# Patient Record
Sex: FEMALE | Race: WHITE | Employment: FULL TIME
[De-identification: names, ages, dates, MRNs, and addresses within clinical notes are randomized per-mention and may not be internally consistent; named-entity substitution may affect disease eponyms.]

---

## 2017-07-15 ENCOUNTER — HEALTH MAINTENANCE LETTER (OUTPATIENT)
Age: 27
End: 2017-07-15

## 2018-01-12 ENCOUNTER — OFFICE VISIT (OUTPATIENT)
Dept: URGENT CARE | Facility: URGENT CARE | Age: 28
End: 2018-01-12
Payer: COMMERCIAL

## 2018-01-12 VITALS
TEMPERATURE: 96.9 F | HEART RATE: 58 BPM | DIASTOLIC BLOOD PRESSURE: 75 MMHG | SYSTOLIC BLOOD PRESSURE: 129 MMHG | OXYGEN SATURATION: 97 %

## 2018-01-12 DIAGNOSIS — F41.8 DEPRESSION WITH ANXIETY: Primary | ICD-10-CM

## 2018-01-12 PROCEDURE — 84443 ASSAY THYROID STIM HORMONE: CPT | Performed by: INTERNAL MEDICINE

## 2018-01-12 PROCEDURE — 36415 COLL VENOUS BLD VENIPUNCTURE: CPT | Performed by: INTERNAL MEDICINE

## 2018-01-12 PROCEDURE — 99203 OFFICE O/P NEW LOW 30 MIN: CPT | Performed by: INTERNAL MEDICINE

## 2018-01-12 ASSESSMENT — ENCOUNTER SYMPTOMS
INSOMNIA: 1
VOMITING: 0
DIZZINESS: 0
HALLUCINATIONS: 0
WEIGHT LOSS: 0
PALPITATIONS: 0
CONSTIPATION: 0
HEADACHES: 0
TREMORS: 0
MYALGIAS: 0
ABDOMINAL PAIN: 0
NERVOUS/ANXIOUS: 1
DIARRHEA: 0
DEPRESSION: 1
NAUSEA: 0

## 2018-01-12 ASSESSMENT — ANXIETY QUESTIONNAIRES
4. TROUBLE RELAXING: NEARLY EVERY DAY
7. FEELING AFRAID AS IF SOMETHING AWFUL MIGHT HAPPEN: SEVERAL DAYS
GAD7 TOTAL SCORE: 16
1. FEELING NERVOUS, ANXIOUS, OR ON EDGE: NEARLY EVERY DAY
2. NOT BEING ABLE TO STOP OR CONTROL WORRYING: NEARLY EVERY DAY
6. BECOMING EASILY ANNOYED OR IRRITABLE: SEVERAL DAYS
3. WORRYING TOO MUCH ABOUT DIFFERENT THINGS: NEARLY EVERY DAY
5. BEING SO RESTLESS THAT IT IS HARD TO SIT STILL: MORE THAN HALF THE DAYS

## 2018-01-12 ASSESSMENT — PATIENT HEALTH QUESTIONNAIRE - PHQ9: SUM OF ALL RESPONSES TO PHQ QUESTIONS 1-9: 19

## 2018-01-12 NOTE — LETTER
[facility]  [address] [facility]  [address]  [place]  [address]  Tel: [phone]    January 16, 2018    Leana Dubois[address]   [facility] [address]        Good day to you Leana.    Your thyroid function test is normal.    Resulted Orders   TSH with free T4 reflex   Result Value Ref Range    TSH 3.10 0.40 - 4.00 mU/L         Dr. Monie Joe  bertrand

## 2018-01-12 NOTE — MR AVS SNAPSHOT
After Visit Summary   1/12/2018    Leana Ivey    MRN: [id number]           Patient Information     Date Of Birth          1990        Visit Information        Provider Department      1/12/2018 5:45 PM Ricky Lomax MD [facility]        Today's Diagnoses     Depression with anxiety    -  1      Care Instructions    Start Zoloft.    Continue Wellbutrin and Trazodone.    Follow up with your regular doctor and discuss about further management for your depression/anxiety.          Follow-ups after your visit        Your next 10 appointments already scheduled     Jan 22, 2018  9:00 AM CST   [facility]    [address] [facility] [address] [phone]           Bring a current list of meds and any records pertaining to this visit. For Physicals, please bring immunization records and any forms needing to be filled out. Please arrive 10 minutes early to complete paperwork.              Who to contact     If you have questions or need follow up information about today's clinic visit or your schedule please contact [facility] directly at [phone].  Normal or non-critical lab and imaging results will be communicated to you by MyChart, letter or phone within 4 business days after the clinic has received the results. If you do not hear from us within 7 days, please contact the clinic through [facility] or phone. If you have a critical or abnormal lab result, we will notify you by phone as soon as possible.  Submit refill requests through [employer] or call your pharmacy and they will forward the refill request to us. Please allow 3 business days for your refill to be completed.          Additional Information About Your Visit        [facility] Information     [employer] lets you send messages to your doctor, view your test results, renew your  "prescriptions, schedule appointments and more. To sign up, go to [email or web address] . Click on \"Log in\" on the left side of the screen, which will take you to the Welcome page. Then click on \"Sign up Now\" on the right side of the page.     You will be asked to enter the access code listed below, as well as some personal information. Please follow the directions to create your username and password.     Your access code is: [email or web address]  Expires: 2018  6:05 PM     Your access code will  in 90 days. If you need help or a new code, please call your [facility] clinic or [phone].        Care EveryWhere ID     This is your Care EveryWhere ID. This could be used by other organizations to access your [facility] medical records  [id number]        Your Vitals Were     Pulse Temperature Pulse Oximetry             58 96.9  F (36.1  C) (Oral) 97%          Blood Pressure from Last 3 Encounters:   18 129/75    Weight from Last 3 Encounters:   No data found for Wt              We Performed the Following     TSH with free T4 reflex          Today's Medication Changes          These changes are accurate as of: 18  6:05 PM.  If you have any questions, ask your nurse or doctor.               Start taking these medicines.        Dose/Directions    sertraline 50 MG tablet   Commonly known as:  ZOLOFT   Used for:  Depression with anxiety   Started by:  Ricky Lomax MD        Dose:  50 mg   Take 1 tablet (50 mg) by mouth daily   Quantity:  30 tablet   Refills:  0            Where to get your medicines      These medications were sent to [facility] [address] [place] [address] & [address]  [address] [place] [address]     Phone:  [phone]     sertraline 50 MG tablet                Primary Care Provider Fax #    Provider Not In System [phone]                Equal Access to Services     KIARA PORTILLO AH: Masha Mancera, " washayyuniel mccormackcarlos, micheleybta kaitzel nielsen, guerrero camposaatonja ah. So [facility] [place] [phone].    ATENCIÓN: Si kelbyla babatunde, tiene a abreu disposición servicios gratuitos de asistencia lingüística. Jennifer al [phone].    We comply with applicable federal civil rights laws and [place] laws. We do not discriminate on the basis of race, color, national origin, age, disability, sex, sexual orientation, or gender identity.            Thank you!     Thank you for choosing [facility]  for your care. Our goal is always to provide you with excellent care. Hearing back from our patients is one way we can continue to improve our services. Please take a few minutes to complete the written survey that you may receive in the mail after your visit with us. Thank you!             Your Updated Medication List - Protect others around you: Learn how to safely use, store and throw away your medicines at www.disposemymeds.org.          This list is accurate as of: 1/12/18  6:05 PM.  Always use your most recent med list.                   Brand Name Dispense Instructions for use Diagnosis    sertraline 50 MG tablet    ZOLOFT    30 tablet    Take 1 tablet (50 mg) by mouth daily    Depression with anxiety       TRAZODONE HCL PO           WELLBUTRIN PO

## 2018-01-12 NOTE — PROGRESS NOTES
"HPI Comments:   Patient comes in with complaint of increasing depression and anxiety. She states that she just got out of a 10 year relationship and this has been very stressful. She is currently on Wellbutrin. Also takes trazodone for insomnia. Denies thoughts of hurting herself or others. She was requesting for something \"fast acting\".  States that she tried her mom's Ativan which worked well for her.      Past Medical History:   Diagnosis Date     Depression with anxiety 1/12/2018       Review of Systems   Constitutional: Negative for malaise/fatigue and weight loss.   Cardiovascular: Negative for chest pain and palpitations.   Gastrointestinal: Negative for abdominal pain, constipation, diarrhea, nausea and vomiting.   Musculoskeletal: Negative for myalgias.   Neurological: Negative for dizziness, tremors and headaches.   Psychiatric/Behavioral: Positive for depression. Negative for hallucinations and suicidal ideas. The patient is nervous/anxious and has insomnia.        /75  Pulse 58  Temp 96.9  F (36.1  C) (Oral)  SpO2 97%      Physical Exam   Constitutional: She is oriented to person, place, and time. No distress.   Neck: No thyromegaly present.   Cardiovascular: Normal rate, regular rhythm and normal heart sounds.    Neurological: She is alert and oriented to person, place, and time. Coordination normal. GCS score is 15.   Psychiatric: Affect normal.   Anxious-appearing   Vitals reviewed.        ICD-10-CM    1. Depression with anxiety F41.8 sertraline (ZOLOFT) 50 MG tablet     TSH with free T4 reflex     PHQ-9 score:    PHQ-9 SCORE 1/12/2018   Total Score 19     RODOLFO-7 SCORE 1/12/2018   Total Score 16       **please refer to HPI for status of conditions    **I informed the patient that I will not prescribe a benzodiazepine for this visit      Patient Instructions   Start Zoloft.    Continue Wellbutrin and Trazodone.    Follow up with your regular doctor and discuss about further management for your " depression/anxiety.

## 2018-01-12 NOTE — NURSING NOTE
Chief Complaint   Patient presents with     Urgent Care     Anxiety     high anxiety       Initial /75  Pulse 58  Temp 96.9  F (36.1  C) (Oral)  SpO2 97% There is no height or weight on file to calculate BMI.  Medication Reconciliation: complete   Michelle HATFIELD MA

## 2018-01-13 LAB — TSH SERPL DL<=0.005 MIU/L-ACNC: 3.1 MU/L (ref 0.4–4)

## 2018-01-13 ASSESSMENT — ANXIETY QUESTIONNAIRES: GAD7 TOTAL SCORE: 16

## 2018-01-13 NOTE — PATIENT INSTRUCTIONS
Start Zoloft.    Continue Wellbutrin and Trazodone.    Follow up with your regular doctor and discuss about further management for your depression/anxiety.

## 2018-01-19 NOTE — PROGRESS NOTES
SUBJECTIVE:   Leana Ivey is a 27 year old female who presents to clinic today for the following health issues:    Patient presents to the clinic to follow up on her medications. She was recently seen in the urgent care for depression and she was put on Sertraline 50 MG one tablet daily. She states that she likes the medication, and it has helped her with her habit of biting her fingernails.     Patient reports that she is also trying to lose weight. She states that she has been managing her diet better and also exercising but she states she is looking for more help with weight management. She usually eats two meals a day, while snacking at work. 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 She is working on eating lighter meals at night before bed. She is eating plenty of liquids during the day.      Problem list and histories reviewed & adjusted, as indicated.  Additional history: as documented    Current Outpatient Prescriptions   Medication Sig Dispense Refill     BuPROPion HCl (WELLBUTRIN PO) Take 150 mg by mouth 2 times daily        TRAZODONE HCL PO        sertraline (ZOLOFT) 50 MG tablet Take 1 tablet (50 mg) by mouth daily 30 tablet 0     No Known Allergies    Reviewed and updated as needed this visit by clinical staff  Tobacco  Meds  Med Hx  Surg Hx  Fam Hx  Soc Hx      Reviewed and updated as needed this visit by Provider         ROS:  Constitutional, HEENT, cardiovascular, pulmonary, gi and gu systems are negative, except as otherwise noted.    This document serves as a record of the services and decisions personally performed and made by Roman Fregoso MD. It was created on his/her behalf by Nu Brown, a trained medical scribe. The creation of this document is based the provider's statements to the medical scribe.  Marcela Brown 9:06 AM, January 22, 2018    OBJECTIVE:   /86  Pulse 82  Temp 98.3  F (36.8  C) (Oral)  Ht 1.651 m (5'  "5\")  Wt 88 kg (194 lb)  SpO2 98%  Breastfeeding? No  BMI 32.28 kg/m2  Body mass index is 32.28 kg/(m^2).    Neck was supple without adenopathy or thyromegaly her carotids were normal without bruits  Chest clear to auscultation and percussion  Cardiovascular S1 and S2 are physiologic without murmurs or gallops  Abdomen bowel sounds were normal.  There is no palpable mass or organomegaly  Extremities nontender without any edema  Pulses pedal pulses are as described otherwise his pulses are bilaterally symmetrical throughout without bruits  Skin without significant abnormality    Diagnostic Test Results:  none     ASSESSMENT/PLAN:     1. Depression with anxiety  -Increased her Wellbutrin to 300 MG 1 tablet daily.  - sertraline (ZOLOFT) 50 MG tablet; Take 1 tablet (50 mg) by mouth daily  Dispense: 90 tablet; Refill: 3  - buPROPion (WELLBUTRIN XL) 300 MG 24 hr tablet; Take 1 tablet (300 mg) by mouth every morning  Dispense: 90 tablet; Refill: 3    2.Weight gain  -Discussed better managing her diet and snacking as she watches carbohydrates and sugar. We also discussed ways to improve the results of her exercise regimen. Goal of 1-4 pounds in a month.  3.persistent disorder of maintaining or initiating sleep  -She will return in 3 months to follow up on depression and anxiety, and weight management. She will return sooner with more acute issues.    Roman Fregoso MD  [facility]    The information in this document, created by the medical scribe for me, accurately reflects the services I personally performed and the decisions made by me. I have reviewed and approved this document for accuracy prior to leaving the patient care area.  Roman Fregoso MD  9:32 AM, 01/22/18        "

## 2018-01-22 ENCOUNTER — OFFICE VISIT (OUTPATIENT)
Dept: FAMILY MEDICINE | Facility: CLINIC | Age: 28
End: 2018-01-22
Payer: COMMERCIAL

## 2018-01-22 VITALS
SYSTOLIC BLOOD PRESSURE: 126 MMHG | TEMPERATURE: 98.3 F | BODY MASS INDEX: 32.32 KG/M2 | HEART RATE: 82 BPM | DIASTOLIC BLOOD PRESSURE: 86 MMHG | HEIGHT: 65 IN | WEIGHT: 194 LBS | OXYGEN SATURATION: 98 %

## 2018-01-22 DIAGNOSIS — F41.8 DEPRESSION WITH ANXIETY: Primary | ICD-10-CM

## 2018-01-22 DIAGNOSIS — G47.00 PERSISTENT DISORDER OF INITIATING OR MAINTAINING SLEEP: ICD-10-CM

## 2018-01-22 DIAGNOSIS — R63.5 WEIGHT GAIN: ICD-10-CM

## 2018-01-22 PROCEDURE — 99203 OFFICE O/P NEW LOW 30 MIN: CPT | Performed by: INTERNAL MEDICINE

## 2018-01-22 RX ORDER — BUPROPION HYDROCHLORIDE 300 MG/1
300 TABLET ORAL EVERY MORNING
Qty: 90 TABLET | Refills: 3 | Status: SHIPPED | OUTPATIENT
Start: 2018-01-22 | End: 2018-09-04

## 2018-01-22 NOTE — MR AVS SNAPSHOT
"              After Visit Summary   2018    Leana Ivey    MRN: [id number]           Patient Information     Date Of Birth          1990        Visit Information        Provider Department      2018 9:00 AM Roman Fregoso MD [facility]        Today's Diagnoses     Depression with anxiety    -  1    Persistent disorder of initiating or maintaining sleep        Weight gain           Follow-ups after your visit        Who to contact     If you have questions or need follow up information about today's clinic visit or your schedule please contact [facility] directly at [phone].  Normal or non-critical lab and imaging results will be communicated to you by [employer], letter or phone within 4 business days after the clinic has received the results. If you do not hear from us within 7 days, please contact the clinic through [employer] or phone. If you have a critical or abnormal lab result, we will notify you by phone as soon as possible.  Submit refill requests through [employer] or call your pharmacy and they will forward the refill request to us. Please allow 3 business days for your refill to be completed.          Additional Information About Your Visit        MyChart Information     [employer] lets you send messages to your doctor, view your test results, renew your prescriptions, schedule appointments and more. To sign up, go to [email or web address] . Click on \"Log in\" on the left side of the screen, which will take you to the Welcome page. Then click on \"Sign up Now\" on the right side of the page.     You will be asked to enter the access code listed below, as well as some personal information. Please follow the directions to create your username and password.     Your access code is: [email or web address]  Expires: 2018  6:05 PM     Your access code will  in 90 days. If you need help or a new code, please call your [facility] or [phone].        Care " "EveryWhere ID     This is your Care EveryWhere ID. This could be used by other organizations to access your [place] medical records  [id number]        Your Vitals Were     Pulse Temperature Height Pulse Oximetry Breastfeeding? BMI (Body Mass Index)    82 98.3  F (36.8  C) (Oral) 5' 5\" (1.651 m) 98% No 32.28 kg/m2       Blood Pressure from Last 3 Encounters:   01/22/18 126/86   01/12/18 129/75    Weight from Last 3 Encounters:   01/22/18 194 lb (88 kg)              Today, you had the following     No orders found for display         Today's Medication Changes          These changes are accurate as of: 1/22/18 11:59 PM.  If you have any questions, ask your nurse or doctor.               These medicines have changed or have updated prescriptions.        Dose/Directions    * WELLBUTRIN PO   This may have changed:  Another medication with the same name was added. Make sure you understand how and when to take each.   Changed by:  Ricky Lomax MD        Dose:  150 mg   Take 150 mg by mouth 2 times daily   Refills:  0       * buPROPion 300 MG 24 hr tablet   Commonly known as:  WELLBUTRIN XL   This may have changed:  You were already taking a medication with the same name, and this prescription was added. Make sure you understand how and when to take each.   Used for:  Depression with anxiety   Changed by:  Roman Fregoso MD        Dose:  300 mg   Take 1 tablet (300 mg) by mouth every morning   Quantity:  90 tablet   Refills:  3       * Notice:  This list has 2 medication(s) that are the same as other medications prescribed for you. Read the directions carefully, and ask your doctor or other care provider to review them with you.         Where to get your medicines      These medications were sent to [facility] [address] [place] [address] & [facility]  [address] [place] [address]     Phone:  [phone]     buPROPion 300 MG 24 hr tablet    sertraline " 50 MG tablet                Primary Care Provider Office Phone # Fax #    Roman Fregoso [phone] [address] [address]        Equal Access to Services     KIARA PORTILLO : Hadii aad ku hadsantio Soeirkali, waaxda luqadaha, qaybta kaalmada adejunyada, guerrero valdezn nicholas alas laMemonahun rausch. So [facility] [phone].    ATENCIÓN: Si habla español, tiene a abreu disposición servicios gratuitos de asistencia lingüística. Llame al [phone].    We comply with applicable federal civil rights laws and [place] laws. We do not discriminate on the basis of race, color, national origin, age, disability, sex, sexual orientation, or gender identity.            Thank you!     Thank you for choosing [facility]  for your care. Our goal is always to provide you with excellent care. Hearing back from our patients is one way we can continue to improve our services. Please take a few minutes to complete the written survey that you may receive in the mail after your visit with us. Thank you!             Your Updated Medication List - Protect others around you: Learn how to safely use, store and throw away your medicines at www.disposemymeds.org.          This list is accurate as of: 1/22/18 11:59 PM.  Always use your most recent med list.                   Brand Name Dispense Instructions for use Diagnosis    sertraline 50 MG tablet    ZOLOFT    90 tablet    Take 1 tablet (50 mg) by mouth daily    Depression with anxiety       TRAZODONE HCL PO           * WELLBUTRIN PO      Take 150 mg by mouth 2 times daily        * buPROPion 300 MG 24 hr tablet    WELLBUTRIN XL    90 tablet    Take 1 tablet (300 mg) by mouth every morning    Depression with anxiety       * Notice:  This list has 2 medication(s) that are the same as other medications prescribed for you. Read the directions carefully, and ask your doctor or other care provider to review them with you.

## 2018-01-22 NOTE — NURSING NOTE
"Chief Complaint   Patient presents with     Establish Care     Recheck Medication       Initial /86  Pulse 82  Temp 98.3  F (36.8  C) (Oral)  Ht 5' 5\" (1.651 m)  Wt 194 lb (88 kg)  SpO2 98%  Breastfeeding? No  BMI 32.28 kg/m2 Estimated body mass index is 32.28 kg/(m^2) as calculated from the following:    Height as of this encounter: 5' 5\" (1.651 m).    Weight as of this encounter: 194 lb (88 kg).  Medication Reconciliation: complete   Nani Hartman CMA      "

## 2018-01-23 PROBLEM — R63.5 WEIGHT GAIN: Status: ACTIVE | Noted: 2018-01-23

## 2018-03-16 ENCOUNTER — HOSPITAL ENCOUNTER (EMERGENCY)
Facility: CLINIC | Age: 28
Discharge: HOME | End: 2018-03-16
Attending: EMERGENCY MEDICINE | Admitting: EMERGENCY MEDICINE
Payer: COMMERCIAL

## 2018-03-16 VITALS
DIASTOLIC BLOOD PRESSURE: 79 MMHG | TEMPERATURE: 98 F | RESPIRATION RATE: 16 BRPM | WEIGHT: 191.14 LBS | BODY MASS INDEX: 31.81 KG/M2 | SYSTOLIC BLOOD PRESSURE: 136 MMHG | OXYGEN SATURATION: 98 % | HEART RATE: 84 BPM

## 2018-03-16 DIAGNOSIS — R11.10 VOMITING AND DIARRHEA: ICD-10-CM

## 2018-03-16 DIAGNOSIS — R19.7 VOMITING AND DIARRHEA: ICD-10-CM

## 2018-03-16 LAB — HCG UR QL: NEGATIVE

## 2018-03-16 PROCEDURE — 99283 EMERGENCY DEPT VISIT LOW MDM: CPT

## 2018-03-16 PROCEDURE — 81025 URINE PREGNANCY TEST: CPT | Performed by: EMERGENCY MEDICINE

## 2018-03-16 PROCEDURE — 25000125 ZZHC RX 250: Performed by: EMERGENCY MEDICINE

## 2018-03-16 RX ORDER — ONDANSETRON 4 MG/1
4-8 TABLET, ORALLY DISINTEGRATING ORAL EVERY 8 HOURS PRN
Qty: 10 TABLET | Refills: 0 | Status: SHIPPED | OUTPATIENT
Start: 2018-03-16 | End: 2018-03-16

## 2018-03-16 RX ORDER — ONDANSETRON 4 MG/1
8 TABLET, ORALLY DISINTEGRATING ORAL ONCE
Status: COMPLETED | OUTPATIENT
Start: 2018-03-16 | End: 2018-03-16

## 2018-03-16 RX ORDER — ONDANSETRON 4 MG/1
4-8 TABLET, ORALLY DISINTEGRATING ORAL EVERY 8 HOURS PRN
Qty: 10 TABLET | Refills: 0 | Status: SHIPPED | OUTPATIENT
Start: 2018-03-16 | End: 2018-03-19

## 2018-03-16 RX ADMIN — ONDANSETRON 8 MG: 4 TABLET, ORALLY DISINTEGRATING ORAL at 11:16

## 2018-03-16 ASSESSMENT — ENCOUNTER SYMPTOMS
ABDOMINAL PAIN: 0
FEVER: 0
NAUSEA: 1
DIARRHEA: 1
VOMITING: 1

## 2018-03-16 NOTE — ED AVS SNAPSHOT
Emergency Department    [address] [place] [address]    Phone:  [phone]    Fax:  [phone]                                       Leana Ivey   MRN: [id number]    Department:   Emergency Department   Date of Visit:  3/16/2018           After Visit Summary Signature Page     I have received my discharge instructions, and my questions have been answered. I have discussed any challenges I see with this plan with the nurse or doctor.    ..........................................................................................................................................  Patient/Patient Representative Signature      ..........................................................................................................................................  Patient Representative Print Name and Relationship to Patient    ..................................................               ................................................  Date                                            Time    ..........................................................................................................................................  Reviewed by Signature/Title    ...................................................              ..............................................  Date                                                            Time

## 2018-03-16 NOTE — DISCHARGE INSTRUCTIONS
*Drink plenty of fluids and advance diet slowly.  *Take medications as prescribed.  Zofran for nausea. Continue your current medications.  *Follow-up with your doctor for a recheck in 2-3 days.  *Return if you are unable to keep fluids down, develop severe abdominal pain, faint or feel like you will faint or become worse in any way.    Discharge Instructions  Gastroenteritis    You have been seen today for vomiting and diarrhea, called gastroenteritis or the stomach flu. This is usually caused by a virus, but some bacteria, parasites, medicines or other medical conditions can cause similar symptoms. At this time your doctor does not find that your vomiting and diarrhea is a sign of anything dangerous or life-threatening.  However, sometimes the signs of serious illness do not show up right away.  If you have new or worse symptoms, you may need to be seen again in the emergency department or by your primary doctor. Remember that serious problems like appendicitis can look like gastroenteritis at first.       Return to the Emergency Department if:    You keep throwing up and you are not able to keep liquids down.    You feel you are getting dehydrated, such as being very thirsty, not urinating at least every 8-12 hours, or feeling faint or lightheaded.     You develop a new fever, or your fever continues for more than 2 days.    You have belly pain that seems worse than cramps, is in one spot, or is getting worse over time.     You have blood in your vomit or in your diarrhea.    You feel very weak     You are not starting to improve within 24 hours of your visit here    What can I do to help myself?    The most important thing to do is to drink clear liquids.  If you have been vomiting a lot, it is best to have only small, frequent sips of liquids.  Drinking too much at once may cause more vomiting. If you are vomiting often, you must replace minerals, sodium and potassium lost with your illness. Pedialyte  and  sports drinks can help you replace these minerals.  You can also drink clear liquids such as water, weak tea, apple juice, and 7-up. Avoid acid liquids (orange), caffeine (coffee) or alcohol. Do not drink milk until you no longer have diarrhea.    After liquids are staying down, you may start eating mild foods. Soda crackers, toast, plain noodles, gelatin, applesauce and bananas are good first choices.  Avoid foods that have acid, are spicy, fatty or fibrous (such as meats, coarse grains, vegetables). You may start eating these foods again in about 3 days when you are better.    Sometimes treatment includes prescription medicine to prevent nausea and vomiting and to prevent diarrhea. If your doctor prescribes these for you, take them as directed.    Nonprescription medicine is available for the treatment of diarrhea and can be very effective.  If you use it, make sure you use the dose recommended on the package.  Avoid Lomotil. Check with your healthcare provider before you use any medicine for diarrhea.    Don t take ibuprofen, or other nonsteroidal anti-inflammatory medicines without checking with your healthcare provider.      Remember that you can always come back to the Emergency Department if you are not able to see your regular doctor in the amount of time listed above, if you get any new symptoms, or if there is anything that worries you.

## 2018-03-16 NOTE — ED AVS SNAPSHOT
Emergency Department    [address] [facility] [address]    Phone:  [phone]    Fax:  [phone]                                       Leana Ivey   MRN: [id number]    Department:   Emergency Department   Date of Visit:  3/16/2018           Patient Information     Date Of Birth          1990        Your diagnoses for this visit were:     Vomiting and diarrhea        You were seen by Rossana Redmond MD.      Follow-up Information     Follow up with Roman Fregoso MD. Schedule an appointment as soon as possible for a visit in 3 days.    Specialty:  Internal Medicine    Why:  As needed    Contact information:    [address] [facility]  [facility] [address] [phone]          Discharge Instructions       *Drink plenty of fluids and advance diet slowly.  *Take medications as prescribed.  Zofran for nausea. Continue your current medications.  *Follow-up with your doctor for a recheck in 2-3 days.  *Return if you are unable to keep fluids down, develop severe abdominal pain, faint or feel like you will faint or become worse in any way.    Discharge Instructions  Gastroenteritis    You have been seen today for vomiting and diarrhea, called gastroenteritis or the stomach flu. This is usually caused by a virus, but some bacteria, parasites, medicines or other medical conditions can cause similar symptoms. At this time your doctor does not find that your vomiting and diarrhea is a sign of anything dangerous or life-threatening.  However, sometimes the signs of serious illness do not show up right away.  If you have new or worse symptoms, you may need to be seen again in the emergency department or by your primary doctor. Remember that serious problems like appendicitis can look like gastroenteritis at first.       Return to the Emergency Department if:    You keep throwing up and you are not able to keep liquids down.    You feel you are getting dehydrated, such as being very  thirsty, not urinating at least every 8-12 hours, or feeling faint or lightheaded.     You develop a new fever, or your fever continues for more than 2 days.    You have belly pain that seems worse than cramps, is in one spot, or is getting worse over time.     You have blood in your vomit or in your diarrhea.    You feel very weak     You are not starting to improve within 24 hours of your visit here    What can I do to help myself?    The most important thing to do is to drink clear liquids.  If you have been vomiting a lot, it is best to have only small, frequent sips of liquids.  Drinking too much at once may cause more vomiting. If you are vomiting often, you must replace minerals, sodium and potassium lost with your illness. Pedialyte  and sports drinks can help you replace these minerals.  You can also drink clear liquids such as water, weak tea, apple juice, and 7-up. Avoid acid liquids (orange), caffeine (coffee) or alcohol. Do not drink milk until you no longer have diarrhea.    After liquids are staying down, you may start eating mild foods. Soda crackers, toast, plain noodles, gelatin, applesauce and bananas are good first choices.  Avoid foods that have acid, are spicy, fatty or fibrous (such as meats, coarse grains, vegetables). You may start eating these foods again in about 3 days when you are better.    Sometimes treatment includes prescription medicine to prevent nausea and vomiting and to prevent diarrhea. If your doctor prescribes these for you, take them as directed.    Nonprescription medicine is available for the treatment of diarrhea and can be very effective.  If you use it, make sure you use the dose recommended on the package.  Avoid Lomotil. Check with your healthcare provider before you use any medicine for diarrhea.    Don t take ibuprofen, or other nonsteroidal anti-inflammatory medicines without checking with your healthcare provider.      Remember that you can always come back to the  Emergency Department if you are not able to see your regular doctor in the amount of time listed above, if you get any new symptoms, or if there is anything that worries you.              24 Hour Appointment Hotline       To make an appointment at any [facility], call [phone] ([phone]). If you don't have a family doctor or clinic, we will help you find one. [facility] clinics are conveniently located to serve the needs of you and your family.             Review of your medicines      START taking        Dose / Directions Last dose taken    ondansetron 4 MG ODT tab   Commonly known as:  ZOFRAN ODT   Dose:  4-8 mg   Quantity:  10 tablet        Take 1-2 tablets (4-8 mg) by mouth every 8 hours as needed   Refills:  0          Our records show that you are taking the medicines listed below. If these are incorrect, please call your family doctor or clinic.        Dose / Directions Last dose taken    etonogestrel 68 MG Impl   Commonly known as:  IMPLANON/NEXPLANON   Dose:  1 each        1 each by Subdermal route once   Refills:  0        levonorgestrel 20 MCG/24HR IUD   Commonly known as:  MIRENA   Dose:  1 each        1 each by Intrauterine route once   Refills:  0        sertraline 50 MG tablet   Commonly known as:  ZOLOFT   Dose:  50 mg   Quantity:  90 tablet        Take 1 tablet (50 mg) by mouth daily   Refills:  3        TRAZODONE HCL PO        Refills:  0        * WELLBUTRIN PO   Dose:  150 mg        Take 150 mg by mouth 2 times daily   Refills:  0        * buPROPion 300 MG 24 hr tablet   Commonly known as:  WELLBUTRIN XL   Dose:  300 mg   Quantity:  90 tablet        Take 1 tablet (300 mg) by mouth every morning   Refills:  3        * Notice:  This list has 2 medication(s) that are the same as other medications prescribed for you. Read the directions carefully, and ask your doctor or other care provider to review them with you.            Prescriptions were sent or printed at these locations (1  Prescription)                   Other Prescriptions                Printed at Department/Unit printer (1 of 1)         ondansetron (ZOFRAN ODT) 4 MG ODT tab                Procedures and tests performed during your visit     HCG qualitative urine      Orders Needing Specimen Collection     None      Pending Results     No orders found from 3/14/2018 to 3/17/2018.            Pending Culture Results     No orders found from 3/14/2018 to 3/17/2018.            Pending Results Instructions     If you had any lab results that were not finalized at the time of your Discharge, you can call the ED Lab Result RN at [phone]. You will be contacted by this team for any positive Lab results or changes in treatment. The nurses are available 7 days a week from 10A to 6:30P.  You can leave a message 24 hours per day and they will return your call.        Test Results From Your Hospital Stay        3/16/2018 11:11 AM      Component Results     Component Value Ref Range & Units Status    HCG Qual Urine Negative NEG^Negative Final    This test is for screening purposes.  Results should be interpreted along with   the clinical picture.  Confirmation testing is available if warranted by   ordering [id number], HCG Quantitative Pregnancy.                  Clinical Quality Measure: Blood Pressure Screening     Your blood pressure was checked while you were in the emergency department today. The last reading we obtained was  BP: 141/85 . Please read the guidelines below about what these numbers mean and what you should do about them.  If your systolic blood pressure (the top number) is less than 120 and your diastolic blood pressure (the bottom number) is less than 80, then your blood pressure is normal. There is nothing more that you need to do about it.  If your systolic blood pressure (the top number) is 120-139 or your diastolic blood pressure (the bottom number) is 80-89, your blood pressure may be higher than it should be. You should  "have your blood pressure rechecked within a year by a primary care provider.  If your systolic blood pressure (the top number) is 140 or greater or your diastolic blood pressure (the bottom number) is 90 or greater, you may have high blood pressure. High blood pressure is treatable, but if left untreated over time it can put you at risk for heart attack, stroke, or kidney failure. You should have your blood pressure rechecked by a primary care provider within the next 4 weeks.  If your provider in the emergency department today gave you specific instructions to follow-up with your doctor or provider even sooner than that, you should follow that instruction and not wait for up to 4 weeks for your follow-up visit.        Thank you for choosing [facility]       Thank you for choosing [facility] for your care. Our goal is always to provide you with excellent care. Hearing back from our patients is one way we can continue to improve our services. Please take a few minutes to complete the written survey that you may receive in the mail after you visit with us. Thank you!        "[employer]" Information     "[employer] lets you send messages to your doctor, view your test results, renew your prescriptions, schedule appointments and more. To sign up, go to [email or web address]" . Click on \"Log in\" on the left side of the screen, which will take you to the Welcome page. Then click on \"Sign up Now\" on the right side of the page.     You will be asked to enter the access code listed below, as well as some personal information. Please follow the directions to create your username and password.     Your access code is: [email or web address]  Expires: 2018  7:05 PM     Your access code will  in 90 days. If you need help or a new code, please call your [facility] clinic or [phone].        Care EveryWhere ID     This is your Care EveryWhere ID. This could be used by other organizations to access your [facility] medical " records  [id number]        Equal Access to Services     KIARA PORTILLO : Masha Mancera, jd wright, [employer]. So [facility] [phone].    ATENCIÓN: Si habla español, tiene a abreu disposición servicios gratuitos de asistencia lingüística. Llame al [phone].    We comply with applicable federal civil rights laws and [place] laws. We do not discriminate on the basis of race, color, national origin, age, disability, sex, sexual orientation, or gender identity.            After Visit Summary       This is your record. Keep this with you and show to your community pharmacist(s) and doctor(s) at your next visit.

## 2018-03-16 NOTE — ED PROVIDER NOTES
History     Chief Complaint:  Nausea & Vomiting     HPI   Leana Ivey is a 27 year old female who presents to the emergency department today for evaluation of nausea/vomiting and diarrhea. The patient reports that 3 days ago the diarrhea started, but stopped yesterday and has not had any since. She reports nausea and 2 episodes of vomiting 2 days ago, although yesterday she did not have any episodes of vomiting. Her vomiting returned today and she notes that others in the office have been sick with the same symptoms of late. The patient is concerned that she may be pregnant because several people in the office have been telling her she might be. However, the patient reports that she is taking implanon and has an IUD placement. She further states that her last period was around the 25th of February. She denies starting any new medications and has not taken anything for the nausea at home. She denies any fevers or abdominal pain.    Allergies:  No Known Drug Allergies      Medications:    implanon  mirena IUD  Zoloft  Wellbutrin  Trazodone      Past Medical History:    Depression with anxiety     Past Surgical History:    History reviewed. No pertinent past surgical history.     Family History:    History reviewed. No pertinent family history.      Social History:  The patient was alone in the ED.  Smoking Status: former  Alcohol Use: yes   Marital Status:  Single [1]     Review of Systems   Constitutional: Negative for fever.   Gastrointestinal: Positive for diarrhea, nausea and vomiting. Negative for abdominal pain.   All other systems reviewed and are negative.    Physical Exam     Patient Vitals for the past 24 hrs:   BP Temp Temp src Pulse Resp SpO2 Weight   03/16/18 1303 136/79 - - 84 16 98 % -   03/16/18 1039 141/85 98  F (36.7  C) Oral 94 16 98 % 86.7 kg (191 lb 2.2 oz)      Physical Exam  General: Well-nourished, appears to be comfortable  Eyes: PERRL, conjunctivae pink no scleral icterus or  conjunctival injection  ENT:  Moist mucus membranes, posterior oropharynx clear without erythema or exudates  Respiratory:  Lungs clear to auscultation bilaterally, no crackles/rubs/wheezes.  Good air movement  CV: Normal rate and rhythm, no murmurs/rubs/gallops  GI:  Abdomen soft and non-distended.  Normoactive BS.  No tenderness, guarding or rebound  Skin: Warm, dry.  No rashes or petechiae  Musculoskeletal: No peripheral edema or calf tenderness  Neuro: Alert and oriented to person/place/time  Psychiatric: Normal affect    Emergency Department Course     Laboratory:  Laboratory findings were communicated with the patient who voiced understanding of the findings.  HCG Qualitative Urine: negative      Interventions:  1116 Zofran 8 mg PO    Emergency Department Course:  Nursing notes and vitals reviewed.  I entered the room.  I performed an exam of the patient as documented above.   The patient provided a urine sample here in the emergency department. This was sent for laboratory testing, findings above.   The patient received the above intervention(s).   1118 the patient was rechecked and she was updated on the results of her laboratory studies.  I discussed the treatment plan with the patient. They expressed understanding of this plan and consented to discharge. They will be discharged home with instructions for care and follow up. In addition, the patient will return to the emergency department if their symptoms persist, worsen, if new symptoms arise or if there is any concern.  All questions were answered.    Impression & Plan      Medical Decision Making:  Leana Ivey is a 27 year old female who presents to the emergency department today for evaluation of nausea, vomiting and diarrhea with minimal abdominal pain in a nonfocal abdominal exam. The differential diagnosis of vomiting and diarrhea is broad and includes such etiologies as viral gastroenteritis, bacterial infection of the large intestine  (salmonella, shigella, campylobacter, e coli, etc), bowel obstruction, intra-abdominal infection such as colitis, cholecystitis, UTI, pyelonephritis, appendicitis, etc.  There are no signs of worrisome intra-abdominal pathologies detected during the visit today.  The patient has a completely benign abdominal exam without rebound, guarding, or marked tenderness to palpation.  She has had minimal episodes of vomiting and diarrhea over the past three days with normal vital signs and so I did not feel labs or IV fluids were indicated and she was in agreement.  She is reassured that she is not pregnant.  She improved with anti-emetics. Supportive outpatient management is therefore indicated.  Abdominal pain precautions are given for home.   It was discussed with the patient to return to the ED for blood in stool, increasing pain, or fevers more than 102.   Feels much improved after interventions in ED.     Diagnosis:    ICD-10-CM    1. Vomiting and diarrhea R11.10     R19.7      Disposition:   The patient was discharged to home.    Discharge Medications:  Discharge Medication List as of 3/16/2018 12:59 PM      START taking these medications    Details   ondansetron (ZOFRAN ODT) 4 MG ODT tab Take 1-2 tablets (4-8 mg) by mouth every 8 hours as needed, Disp-10 tablet, R-0, Local Print           Scribe Disclosure:  I, Tonio Christie, am serving as a scribe on 3/16/2018 to document services personally performed by Rossana Redmond MD, based on my observations and the provider's statements to me.     EMERGENCY DEPARTMENT       Rossana Redmond MD  03/16/18 7279

## 2018-06-12 ENCOUNTER — OFFICE VISIT (OUTPATIENT)
Dept: FAMILY MEDICINE | Facility: CLINIC | Age: 28
End: 2018-06-12
Payer: COMMERCIAL

## 2018-06-12 VITALS
TEMPERATURE: 97.8 F | SYSTOLIC BLOOD PRESSURE: 136 MMHG | HEART RATE: 80 BPM | DIASTOLIC BLOOD PRESSURE: 90 MMHG | WEIGHT: 190 LBS | HEIGHT: 65 IN | RESPIRATION RATE: 14 BRPM | OXYGEN SATURATION: 98 % | BODY MASS INDEX: 31.65 KG/M2

## 2018-06-12 DIAGNOSIS — R03.0 ELEVATED BLOOD PRESSURE READING WITHOUT DIAGNOSIS OF HYPERTENSION: ICD-10-CM

## 2018-06-12 DIAGNOSIS — S13.9XXA NECK SPRAIN, INITIAL ENCOUNTER: Primary | ICD-10-CM

## 2018-06-12 PROCEDURE — 99213 OFFICE O/P EST LOW 20 MIN: CPT | Performed by: FAMILY MEDICINE

## 2018-06-12 RX ORDER — CYCLOBENZAPRINE HCL 10 MG
5-10 TABLET ORAL 3 TIMES DAILY PRN
Qty: 30 TABLET | Refills: 1 | Status: SHIPPED | OUTPATIENT
Start: 2018-06-12 | End: 2018-09-04

## 2018-06-12 NOTE — MR AVS SNAPSHOT
After Visit Summary   6/12/2018    Leana Ivey    MRN: [id number]           Patient Information     Date Of Birth          1990        Visit Information        Provider Department      6/12/2018 4:00 PM Deanna Pederson MD [facility]        Today's Diagnoses     Neck sprain, initial encounter    -  1      Care Instructions      Reach and Hold Exercise       Do this exercise on your hands and knees. Keep your knees under your hips and your hands under your shoulders. Keep your spine in a neutral position (not arched or sagging). Keep your ears in line with your shoulders. Hold for a few seconds before starting the exercise:  1. Tighten your belly muscles and raise one arm straight in front of you, palm down. Hold for 5 seconds, then lower. Repeat 5 times.  2. Do the exercise again, this time lifting your arm to the side. Repeat 5 times.  3. Do the exercise again, this time lifting your arm backward, palm up. Repeat 5 times.  Switch sides and do each exercise with the other arm.  Date Last Reviewed: 11/1/2017 2000-2017 [employer]. [address] [place] [address]. All rights reserved. This information is not intended as a substitute for professional medical care. Always follow your healthcare professional's instructions.        Shoulder and Upper Back Stretch  To start, stand tall with your ears, shoulders, and hips in line. Your feet should be slightly apart, positioned just under your hips. Focus your eyes directly in front of you.  this position for a few seconds before starting your exercise. This helps increase your awareness of proper posture.          Reach overhead and slightly back with both arms. Keep your shoulders and neck aligned and your elbows behind your shoulders:    With your palms facing the ceiling, turn your fingers inward.    Take a deep breath. Breathe out, and lower your elbows toward your buttocks. Hold for 5  seconds, then return to starting position.    Repeat 3 times.  Date Last Reviewed: 11/1/2017 2000-2017 [employer]. [address] [place] [address]. All rights reserved. This information is not intended as a substitute for professional medical care. Always follow your healthcare professional's instructions.        Shoulder Clock Exercise    To start, stand tall with your ears, shoulders, and hips in line. Your feet should be slightly apart, positioned just under your hips. Focus your eyes directly in front of you.  this position for a few seconds before starting your exercise. This helps increase your awareness of proper posture.    Imagine that your right shoulder is the center of a clock. With the outer point of your shoulder, roll it around to slowly trace the outer edge of the clock.    Move clockwise first, then counterclockwise.    Repeat 3 to 5 times. Switch shoulders.   Date Last Reviewed: 10/2/2015    3516-2531 [employer]. [address] [place] [address]. All rights reserved. This information is not intended as a substitute for professional medical care. Always follow your healthcare professional's instructions.        Shoulder Shrug Exercise    To start, sit in a chair with your feet flat on the floor. Shift your weight slightly forward to avoid rounding your back. Relax. Keep your ears, shoulders, and hips aligned:    Raise both of your shoulders as high as you can, as if you were trying to touch them to your ears. Keep your head and neck still and relaxed.    Hold for a count of 10. Release.    Repeat 5 times.  For your safety, check with your healthcare provider before starting an exercise program.   Date Last Reviewed: 11/1/2017 2000-2017 [employer]. [address] [place] [address]. All rights reserved. This information is not intended as a substitute for professional medical care. Always follow your healthcare  professional's instructions.                Follow-ups after your visit        Additional Services     [facility] PT, HAND, AND CHIROPRACTIC REFERRAL       **This order will print in the [facility] Scheduling Office**    Physical Therapy, Hand Therapy and Chiropractic Care are available through:    *[facility]  *[facility]  *[facility]    Call one number to schedule at any of the above locations: [phone].    Your provider has referred you to: Chiropractic at [facility] or [facility]    Indication/Reason for Referral: Neck Pain  Onset of Illness: since 05/30/2018  Therapy Orders: Evaluate and Treat  Special Programs: Acupuncture and None    Albina Montemayor      Additional Comments for the Therapist or Chiropractor: none    Please be aware that coverage of these services is subject to the terms and limitations of your health insurance plan.  Call member services at your health plan with any benefit or coverage questions.      Please bring the following to your appointment:    *Your personal calendar for scheduling future appointments  *Comfortable clothing                  Who to contact     If you have questions or need follow up information about today's clinic visit or your schedule please contact [facility] directly at [phone].  Normal or non-critical lab and imaging results will be communicated to you by MyChart, letter or phone within 4 business days after the clinic has received the results. If you do not hear from us within 7 days, please contact the clinic through [employer] or phone. If you have a critical or abnormal lab result, we will notify you by phone as soon as possible.  Submit refill requests through [employer] or call your pharmacy and they will forward the refill request to us. Please allow 3 business days for your refill to be completed.          Additional Information About Your Visit        GFRANQ Information     [employer]ANQ lets you send messages to  "your doctor, view your test results, renew your prescriptions, schedule appointments and more. To sign up, go to [email or web address] . Click on \"Log in\" on the left side of the screen, which will take you to the Welcome page. Then click on \"Sign up Now\" on the right side of the page.     You will be asked to enter the access code listed below, as well as some personal information. Please follow the directions to create your username and password.     Your access code is: [email or web address]  Expires: 9/10/2018  4:30 PM     Your access code will  in 90 days. If you need help or a new code, please call your [facility] clinic or [phone].        Care EveryWhere ID     This is your Care EveryWhere ID. This could be used by other organizations to access your [facility] medical records  [id number]        Your Vitals Were     Pulse Temperature Respirations Height Pulse Oximetry Breastfeeding?    80 97.8  F (36.6  C) (Oral) 14 5' 5\" (1.651 m) 98% No    BMI (Body Mass Index)                   31.62 kg/m2            Blood Pressure from Last 3 Encounters:   18 136/90   18 136/79   18 126/86    Weight from Last 3 Encounters:   18 190 lb (86.2 kg)   18 191 lb 2.2 oz (86.7 kg)   18 194 lb (88 kg)              We Performed the Following     [facility] PT, HAND, AND CHIROPRACTIC REFERRAL          Today's Medication Changes          These changes are accurate as of 18  4:40 PM.  If you have any questions, ask your nurse or doctor.               Start taking these medicines.        Dose/Directions    cyclobenzaprine 10 MG tablet   Commonly known as:  FLEXERIL   Used for:  Neck sprain, initial encounter   Started by:  Deanna Pederson MD        Dose:  5-10 mg   Take 0.5-1 tablets (5-10 mg) by mouth 3 times daily as needed for muscle spasms   Quantity:  30 tablet   Refills:  1         These medicines have changed or have updated prescriptions.        Dose/Directions    buPROPion 300 MG 24 hr " tablet   Commonly known as:  WELLBUTRIN XL   This may have changed:  Another medication with the same name was removed. Continue taking this medication, and follow the directions you see here.   Used for:  Depression with anxiety   Changed by:  Deanna Pederson MD        Dose:  300 mg   Take 1 tablet (300 mg) by mouth every morning   Quantity:  90 tablet   Refills:  3            Where to get your medicines      These medications were sent to [facility] [address] [place] [address] & [address]  [address] [place] [address]     Phone:  [phone]     cyclobenzaprine 10 MG tablet                Primary Care Provider Office Phone # Fax #    Roman Fregoso [phone] [address] [address]        Equal Access to Services     [facility] LINDSEY : Hadii elana jewell hadasho Sopiyush, waaxda luqadaha, qaybta kaalmada gia, guerrero tsang . So [facility] [phone].    ATENCIÓN: Si habla español, tiene a abreu disposición servicios gratuitos de asistencia lingüística. Jennifer al [phone].    We comply with applicable federal civil rights laws and [place] laws. We do not discriminate on the basis of race, color, national origin, age, disability, sex, sexual orientation, or gender identity.            Thank you!     Thank you for choosing [facility]  for your care. Our goal is always to provide you with excellent care. Hearing back from our patients is one way we can continue to improve our services. Please take a few minutes to complete the written survey that you may receive in the mail after your visit with us. Thank you!             Your Updated Medication List - Protect others around you: Learn how to safely use, store and throw away your medicines at www.disposemymeds.org.          This list is accurate as of 6/12/18  4:40 PM.  Always use your most recent med list.                   Brand Name  Dispense Instructions for use Diagnosis    buPROPion 300 MG 24 hr tablet    WELLBUTRIN XL    90 tablet    Take 1 tablet (300 mg) by mouth every morning    Depression with anxiety       cyclobenzaprine 10 MG tablet    FLEXERIL    30 tablet    Take 0.5-1 tablets (5-10 mg) by mouth 3 times daily as needed for muscle spasms    Neck sprain, initial encounter       levonorgestrel 20 MCG/24HR IUD    MIRENA     1 each by Intrauterine route once        sertraline 50 MG tablet    ZOLOFT    90 tablet    Take 1 tablet (50 mg) by mouth daily    Depression with anxiety       TRAZODONE HCL PO      Take 50 mg by mouth At Bedtime 1-2 tabs as needed for insomnia

## 2018-06-12 NOTE — PROGRESS NOTES
"  SUBJECTIVE:   Leana Ivey is a 27 year old female who presents to clinic today for the following health issues:      Musculoskeletal problem/pain      Duration: since May 30th    Description  Location: right  Trapezius- neck    Intensity:  Moderate-severe when waking up    Accompanying signs and symptoms: radiation of pain to right scapula and swelling    History-     Moved on May 30th and thinks may have pulled a muscle or hurt neck-very stiff in morning  Previous similar problem: no   Previous evaluation:  none    Precipitating or alleviating factors:  Trauma or overuse: YES- moving household   Aggravating factors include: lifting and hurts all the time-worse when trying to turn head-throbbing type pain    Therapies tried and outcome: heat, ice, acetaminophen, Ibuprofen and deep heating rub  Intermittent dull pain on the right.  Pain is on the scale of 7 out of 10 sometimes decreases to 5 out of 10.  Localized in the neck area does not radiate to upper extremities.  She denies numbness tingling or weakness.    PROBLEMS TO ADD ON...    Problem list and histories reviewed & adjusted, as indicated.  Additional history: as documented    Patient Active Problem List   Diagnosis     Depression with anxiety     Persistent disorder of initiating or maintaining sleep     Weight gain     History reviewed. No pertinent surgical history.    Social History   Substance Use Topics     Smoking status: Light Tobacco Smoker     Smokeless tobacco: Never Used     Alcohol use Yes     History reviewed. No pertinent family history.        Reviewed and updated as needed this visit by clinical staff       ROS:  Constitutional, HEENT, cardiovascular, pulmonary, gi and gu systems are negative, except as otherwise noted.    OBJECTIVE:     /90  Pulse 80  Temp 97.8  F (36.6  C) (Oral)  Resp 14  Ht 5' 5\" (1.651 m)  Wt 190 lb (86.2 kg)  SpO2 98%  Breastfeeding? No  BMI 31.62 kg/m2  Body mass index is 31.62 " kg/(m^2).  GENERAL: healthy, alert and no distress  NECK: no adenopathy, no asymmetry, masses, or scars and thyroid normal to palpation  RESP: lungs clear to auscultation - no rales, rhonchi or wheezes  CV: regular rate and rhythm, normal S1 S2, no S3 or S4, no murmur, click or rub, no peripheral edema and peripheral pulses strong  MS: no gross musculoskeletal defects noted, no edema.  Mild tenderness to palpation over the right trapezius muscle.  NEURO: Normal strength and tone, mentation intact and speech normal    Diagnostic Test Results:  none     ASSESSMENT/PLAN:   1. Neck sprain, initial encounter  The patient was advised to continue with home medications and was given written instructions regarding home exercises.  She was referred to [facility] for physical therapy and chiropractic therapies.  - [facility] PT, HAND, AND CHIROPRACTIC REFERRAL  - cyclobenzaprine (FLEXERIL) 10 MG tablet; Take 0.5-1 tablets (5-10 mg) by mouth 3 times daily as needed for muscle spasms  Dispense: 30 tablet; Refill: 1    2. Elevated blood pressure reading without diagnosis of hypertension  Elevated blood pressure at today's visit.  We will recheck blood pressure in the future.      Deanna Pederson MD  [employer]

## 2018-06-12 NOTE — NURSING NOTE
"Chief Complaint   Patient presents with     Musculoskeletal Problem     back, shoulder and neck pain       Initial /90  Pulse 80  Temp 97.8  F (36.6  C) (Oral)  Resp 14  Ht 5' 5\" (1.651 m)  Wt 190 lb (86.2 kg)  SpO2 98%  Breastfeeding? No  BMI 31.62 kg/m2 Estimated body mass index is 31.62 kg/(m^2) as calculated from the following:    Height as of this encounter: 5' 5\" (1.651 m).    Weight as of this encounter: 190 lb (86.2 kg).  BP completed using cuff size: regular    Health Maintenance that is potentially due pending provider review:  /  Health Maintenance Due   Topic Date Due     HPV IMMUNIZATION (1 of 3 - Female 3 Dose Series) 09/15/2003     TETANUS IMMUNIZATION (SYSTEM ASSIGNED)  09/15/2010     DEPRESSION ACTION PLAN Q1 YR  09/15/2010     HIV SCREEN (SYSTEM ASSIGNED)  09/15/2010     PAP SCREENING Q3 YR (SYSTEM ASSIGNED)  09/15/2013     PHQ-9 Q6 MONTHS  07/12/2018         Pt advised  "

## 2018-06-12 NOTE — PATIENT INSTRUCTIONS
Reach and Hold Exercise       Do this exercise on your hands and knees. Keep your knees under your hips and your hands under your shoulders. Keep your spine in a neutral position (not arched or sagging). Keep your ears in line with your shoulders. Hold for a few seconds before starting the exercise:  1. Tighten your belly muscles and raise one arm straight in front of you, palm down. Hold for 5 seconds, then lower. Repeat 5 times.  2. Do the exercise again, this time lifting your arm to the side. Repeat 5 times.  3. Do the exercise again, this time lifting your arm backward, palm up. Repeat 5 times.  Switch sides and do each exercise with the other arm.  Date Last Reviewed: 11/1/2017 2000-2017 [employer]. [address] [place] [address]. All rights reserved. This information is not intended as a substitute for professional medical care. Always follow your healthcare professional's instructions.        Shoulder and Upper Back Stretch  To start, stand tall with your ears, shoulders, and hips in line. Your feet should be slightly apart, positioned just under your hips. Focus your eyes directly in front of you.  this position for a few seconds before starting your exercise. This helps increase your awareness of proper posture.          Reach overhead and slightly back with both arms. Keep your shoulders and neck aligned and your elbows behind your shoulders:    With your palms facing the ceiling, turn your fingers inward.    Take a deep breath. Breathe out, and lower your elbows toward your buttocks. Hold for 5 seconds, then return to starting position.    Repeat 3 times.  Date Last Reviewed: 11/1/2017 2000-2017 [employer]. [address] [place] [address]. All rights reserved. This information is not intended as a substitute for professional medical care. Always follow your healthcare professional's instructions.        Shoulder Clock Exercise    To  start, stand tall with your ears, shoulders, and hips in line. Your feet should be slightly apart, positioned just under your hips. Focus your eyes directly in front of you.  this position for a few seconds before starting your exercise. This helps increase your awareness of proper posture.    Imagine that your right shoulder is the center of a clock. With the outer point of your shoulder, roll it around to slowly trace the outer edge of the clock.    Move clockwise first, then counterclockwise.    Repeat 3 to 5 times. Switch shoulders.   Date Last Reviewed: 10/2/2015    [id number] [employer]. [address] [place] [address]. All rights reserved. This information is not intended as a substitute for professional medical care. Always follow your healthcare professional's instructions.        Shoulder Shrug Exercise    To start, sit in a chair with your feet flat on the floor. Shift your weight slightly forward to avoid rounding your back. Relax. Keep your ears, shoulders, and hips aligned:    Raise both of your shoulders as high as you can, as if you were trying to touch them to your ears. Keep your head and neck still and relaxed.    Hold for a count of 10. Release.    Repeat 5 times.  For your safety, check with your healthcare provider before starting an exercise program.   Date Last Reviewed: 11/1/2017 2000-2017 [employer]. [address] [place] [address]. All rights reserved. This information is not intended as a substitute for professional medical care. Always follow your healthcare professional's instructions.

## 2018-07-16 ENCOUNTER — OFFICE VISIT (OUTPATIENT)
Dept: FAMILY MEDICINE | Facility: CLINIC | Age: 28
End: 2018-07-16
Payer: COMMERCIAL

## 2018-07-16 VITALS
WEIGHT: 191.2 LBS | HEART RATE: 85 BPM | SYSTOLIC BLOOD PRESSURE: 131 MMHG | TEMPERATURE: 98.3 F | OXYGEN SATURATION: 99 % | BODY MASS INDEX: 31.86 KG/M2 | DIASTOLIC BLOOD PRESSURE: 88 MMHG | HEIGHT: 65 IN

## 2018-07-16 DIAGNOSIS — J03.00 ACUTE NON-RECURRENT STREPTOCOCCAL TONSILLITIS: Primary | ICD-10-CM

## 2018-07-16 LAB
DEPRECATED S PYO AG THROAT QL EIA: ABNORMAL
SPECIMEN SOURCE: ABNORMAL

## 2018-07-16 PROCEDURE — 87880 STREP A ASSAY W/OPTIC: CPT | Performed by: FAMILY MEDICINE

## 2018-07-16 PROCEDURE — 99213 OFFICE O/P EST LOW 20 MIN: CPT | Performed by: FAMILY MEDICINE

## 2018-07-16 RX ORDER — PENICILLIN V POTASSIUM 500 MG/1
500 TABLET, FILM COATED ORAL 4 TIMES DAILY
Qty: 40 TABLET | Refills: 0 | Status: SHIPPED | OUTPATIENT
Start: 2018-07-16 | End: 2018-08-09

## 2018-07-16 NOTE — MR AVS SNAPSHOT
"              After Visit Summary   7/16/2018    Leana Ivey    MRN: [id number]           Patient Information     Date Of Birth          1990        Visit Information        Provider Department      7/16/2018 11:20 AM Chari Andujar MD [facility]        Today's Diagnoses     Acute non-recurrent streptococcal tonsillitis    -  1      Care Instructions    1. Acute non-recurrent streptococcal tonsillitis  Positive strep  -start antibiotic   - penicillin V potassium (VEETID) 500 MG tablet; Take 1 tablet (500 mg) by mouth 4 times daily  Dispense: 40 tablet; Refill: 0      Need separate appointment for pap smear & renewal of medications            Follow-ups after your visit        Who to contact     If you have questions or need follow up information about today's clinic visit or your schedule please contact [facility] directly at [phone].  Normal or non-critical lab and imaging results will be communicated to you by MyChart, letter or phone within 4 business days after the clinic has received the results. If you do not hear from us within 7 days, please contact the clinic through MyChart or phone. If you have a critical or abnormal lab result, we will notify you by phone as soon as possible.  Submit refill requests through [employer] or call your pharmacy and they will forward the refill request to us. Please allow 3 business days for your refill to be completed.          Additional Information About Your Visit        MyChart Information     [employer] lets you send messages to your doctor, view your test results, renew your prescriptions, schedule appointments and more. To sign up, go to [email or web address] . Click on \"Log in\" on the left side of the screen, which will take you to the Welcome page. Then click on \"Sign up Now\" on the right side of the page.     You will be asked to enter the access code listed below, as well as some personal information. Please follow the " "directions to create your username and password.     Your access code is: [email or web address]  Expires: 9/10/2018  4:30 PM     Your access code will  in 90 days. If you need help or a new code, please call your [place] clinic or [phone].        Care EveryWhere ID     This is your Care EveryWhere ID. This could be used by other organizations to access your [place] medical records  [id number]        Your Vitals Were     Pulse Temperature Height Pulse Oximetry Breastfeeding? BMI (Body Mass Index)    85 98.3  F (36.8  C) (Oral) 5' 5\" (1.651 m) 99% No 31.82 kg/m2       Blood Pressure from Last 3 Encounters:   18 131/88   18 136/90   18 136/79    Weight from Last 3 Encounters:   18 191 lb 3.2 oz (86.7 kg)   18 190 lb (86.2 kg)   18 191 lb 2.2 oz (86.7 kg)              We Performed the Following     Strep, Rapid Screen          Today's Medication Changes          These changes are accurate as of 18 11:41 AM.  If you have any questions, ask your nurse or doctor.               Start taking these medicines.        Dose/Directions    penicillin V potassium 500 MG tablet   Commonly known as:  VEETID   Used for:  Acute non-recurrent streptococcal tonsillitis   Started by:  Chari Andujar MD        Dose:  500 mg   Take 1 tablet (500 mg) by mouth 4 times daily   Quantity:  40 tablet   Refills:  0            Where to get your medicines      These medications were sent to [facility] [address] [place] [address] & [address]  [address] [place] [address]     Phone:  [phone]     penicillin V potassium 500 MG tablet                Primary Care Provider Office Phone # Fax #    Roman Fregoso [phone] [address]  [facility] [address]        Equal Access to Services     [facility] LINDSEY AH: Hadii elana Mancera, jd lulee, qaybta kaguerrero maldonado" la'nahun rausch. So [facility] [phone].    ATENCIÓN: Si habla baabtunde, tiene a abreu disposición servicios gratuitos de asistencia lingüística. Jennifer willis [phone].    We comply with applicable federal civil rights laws and [place] laws. We do not discriminate on the basis of race, color, national origin, age, disability, sex, sexual orientation, or gender identity.            Thank you!     Thank you for choosing [facility]  for your care. Our goal is always to provide you with excellent care. Hearing back from our patients is one way we can continue to improve our services. Please take a few minutes to complete the written survey that you may receive in the mail after your visit with us. Thank you!             Your Updated Medication List - Protect others around you: Learn how to safely use, store and throw away your medicines at www.disposemymeds.org.          This list is accurate as of 7/16/18 11:41 AM.  Always use your most recent med list.                   Brand Name Dispense Instructions for use Diagnosis    buPROPion 300 MG 24 hr tablet    WELLBUTRIN XL    90 tablet    Take 1 tablet (300 mg) by mouth every morning    Depression with anxiety       cyclobenzaprine 10 MG tablet    FLEXERIL    30 tablet    Take 0.5-1 tablets (5-10 mg) by mouth 3 times daily as needed for muscle spasms    Neck sprain, initial encounter       levonorgestrel 20 MCG/24HR IUD    MIRENA     1 each by Intrauterine route once        penicillin V potassium 500 MG tablet    VEETID    40 tablet    Take 1 tablet (500 mg) by mouth 4 times daily    Acute non-recurrent streptococcal tonsillitis       sertraline 50 MG tablet    ZOLOFT    90 tablet    Take 1 tablet (50 mg) by mouth daily    Depression with anxiety       TRAZODONE HCL PO      Take 50 mg by mouth At Bedtime 1-2 tabs as needed for insomnia

## 2018-07-16 NOTE — PATIENT INSTRUCTIONS
1. Acute non-recurrent streptococcal tonsillitis  Positive strep  -start antibiotic   - penicillin V potassium (VEETID) 500 MG tablet; Take 1 tablet (500 mg) by mouth 4 times daily  Dispense: 40 tablet; Refill: 0      Need separate appointment for pap smear & renewal of medications

## 2018-07-16 NOTE — LETTER
Re; Leana Ivey  ; 1990      Leana Ivey Seen in clinic today  Please excuse from  Work 18    Thanks        Chari Andujar MD  [facility]  [phone]

## 2018-07-16 NOTE — PROGRESS NOTES
"  SUBJECTIVE:   Leana Ivey is a 27 year old female who presents to clinic today for the following health issues:      Chief Complaint   Patient presents with     Pharyngitis     sore throat started yesterday, no other sx    sore throat for a day  Mild to moderate  Had strept in past  Feels about the same  Another friend was complains of sore throat as well, maybe that's where it came from and she was never tested      PROBLEMS TO ADD ON...    Problem list and histories reviewed & adjusted, as indicated.  Additional history: as documented    Patient Active Problem List   Diagnosis     Depression with anxiety     Persistent disorder of initiating or maintaining sleep     Weight gain     No past surgical history on file.    Social History   Substance Use Topics     Smoking status: Light Tobacco Smoker     Smokeless tobacco: Never Used     Alcohol use Yes     No family history on file.        Reviewed and updated as needed this visit by clinical staff  Allergies  Meds       Reviewed and updated as needed this visit by Provider         ROS:  Constitutional, HEENT, cardiovascular, pulmonary, gi and gu systems are negative, except as otherwise noted.    OBJECTIVE:     /88  Pulse 85  Temp 98.3  F (36.8  C) (Oral)  Ht 5' 5\" (1.651 m)  Wt 191 lb 3.2 oz (86.7 kg)  SpO2 99%  Breastfeeding? No  BMI 31.82 kg/m2  Body mass index is 31.82 kg/(m^2).  GENERAL: healthy, alert and no distress  HENT: hyperemic oropharynx   normal cephalic/atraumatic and ear canals and TM's normal  NECK: no adenopathy, no asymmetry, masses, or scars and thyroid normal to palpation  RESP: lungs clear to auscultation - no rales, rhonchi or wheezes  CV: regular rate and rhythm,   ABDOMEN: soft, nontender, no hepatosplenomegaly, no masses and bowel sounds normal      Diagnostic Test Results:  Results for orders placed or performed in visit on 07/16/18 (from the past 24 hour(s))   Strep, Rapid Screen   Result Value Ref Range    " Specimen Description Throat     Rapid Strep A Screen (A)      POSITIVE: Group A Streptococcal antigen detected by immunoassay.       ASSESSMENT/PLAN:     1. Acute non-recurrent streptococcal tonsillitis  Positive strep  -start antibiotic   - penicillin V potassium (VEETID) 500 MG tablet; Take 1 tablet (500 mg) by mouth 4 times daily  Dispense: 40 tablet; Refill: 0  -Potential medication side effects were discussed with the patient; let me know if any occur.    Is Over due for pap- reminder is given  -also has been seeing another priovide for depression  she denies suicidal thoughts or ideation.reports no side effects from medications. Would like to continue.      Chari Andujar MD  [facility]

## 2018-07-16 NOTE — NURSING NOTE
"Chief Complaint   Patient presents with     Pharyngitis     sore throat started yesterday, no other sx     /88  Pulse 85  Temp 98.3  F (36.8  C) (Oral)  Ht 5' 5\" (1.651 m)  Wt 191 lb 3.2 oz (86.7 kg)  SpO2 99%  Breastfeeding? No  BMI 31.82 kg/m2 Estimated body mass index is 31.82 kg/(m^2) as calculated from the following:    Height as of this encounter: 5' 5\" (1.651 m).    Weight as of this encounter: 191 lb 3.2 oz (86.7 kg).  Medication Reconciliation: complete      Health Maintenance that is potentially due pending provider review:  Pap Smear and PHQ9    Pt will schedule physical appt and have pap smear done at that time.  Completing PHQ9 today.     ESTUARDO Martin  "

## 2018-07-17 ASSESSMENT — PATIENT HEALTH QUESTIONNAIRE - PHQ9: SUM OF ALL RESPONSES TO PHQ QUESTIONS 1-9: 5

## 2018-07-26 ENCOUNTER — NURSE TRIAGE (OUTPATIENT)
Dept: NURSING | Facility: CLINIC | Age: 28
End: 2018-07-26

## 2018-07-26 ENCOUNTER — TELEPHONE (OUTPATIENT)
Dept: FAMILY MEDICINE | Facility: CLINIC | Age: 28
End: 2018-07-26

## 2018-07-26 ENCOUNTER — OFFICE VISIT (OUTPATIENT)
Dept: FAMILY MEDICINE | Facility: CLINIC | Age: 28
End: 2018-07-26
Payer: COMMERCIAL

## 2018-07-26 VITALS
TEMPERATURE: 98.1 F | HEIGHT: 65 IN | BODY MASS INDEX: 31.59 KG/M2 | HEART RATE: 97 BPM | SYSTOLIC BLOOD PRESSURE: 133 MMHG | WEIGHT: 189.6 LBS | OXYGEN SATURATION: 97 % | DIASTOLIC BLOOD PRESSURE: 83 MMHG

## 2018-07-26 DIAGNOSIS — F41.8 DEPRESSION WITH ANXIETY: ICD-10-CM

## 2018-07-26 DIAGNOSIS — G47.00 PERSISTENT DISORDER OF INITIATING OR MAINTAINING SLEEP: ICD-10-CM

## 2018-07-26 DIAGNOSIS — J35.1 HYPERTROPHY OF TONSILS: ICD-10-CM

## 2018-07-26 DIAGNOSIS — J03.01 ACUTE RECURRENT STREPTOCOCCAL TONSILLITIS: Primary | ICD-10-CM

## 2018-07-26 LAB
DEPRECATED S PYO AG THROAT QL EIA: ABNORMAL
KOH PREP SPEC: NORMAL
SPECIMEN SOURCE: ABNORMAL
SPECIMEN SOURCE: NORMAL

## 2018-07-26 PROCEDURE — 87210 SMEAR WET MOUNT SALINE/INK: CPT | Performed by: FAMILY MEDICINE

## 2018-07-26 PROCEDURE — 87880 STREP A ASSAY W/OPTIC: CPT | Performed by: FAMILY MEDICINE

## 2018-07-26 PROCEDURE — 99214 OFFICE O/P EST MOD 30 MIN: CPT | Performed by: FAMILY MEDICINE

## 2018-07-26 RX ORDER — PREDNISONE 20 MG/1
20 TABLET ORAL DAILY
Qty: 5 TABLET | Refills: 0 | Status: SHIPPED | OUTPATIENT
Start: 2018-07-26 | End: 2018-09-04

## 2018-07-26 NOTE — NURSING NOTE
"Chief Complaint   Patient presents with     Pharyngitis     Pt c/o sore throat, did 10 days of abx for positive strep, did have about 4 missed doses this weekend      /83  Pulse 97  Temp 98.1  F (36.7  C) (Oral)  Ht 5' 5\" (1.651 m)  Wt 189 lb 9.6 oz (86 kg)  SpO2 97%  BMI 31.55 kg/m2 Estimated body mass index is 31.55 kg/(m^2) as calculated from the following:    Height as of this encounter: 5' 5\" (1.651 m).    Weight as of this encounter: 189 lb 9.6 oz (86 kg).        Health Maintenance due pending provider review:  Pap Smear    Aware due, will make appt    Adriana López CMA  "

## 2018-07-26 NOTE — MR AVS SNAPSHOT
After Visit Summary   7/26/2018    Leana Ivey    MRN: [id number]           Patient Information     Date Of Birth          1990        Visit Information        Provider Department      7/26/2018 9:40 AM Chari Andujar MD [facility]        Today's Diagnoses     Acute recurrent streptococcal tonsillitis    -  1    Hypertrophy of tonsils        Depression with anxiety        Persistent disorder of initiating or maintaining sleep           Follow-ups after your visit        Additional Services     OTOLARYNGOLOGY REFERRAL       Your provider has referred you to: [facility]: [facility] (O[facility] - [place] [phone]  [email or web address]  R[facility]: [facility] - [place] [phone]   [email or web address]  N: [facility] - [place] [phone]   [email or web address]/  N: [facility] - [place] [phone]   [email or web address]    Please be aware that coverage of these services is subject to the terms and limitations of your health insurance plan.  Call member services at your health plan with any benefit or coverage questions.      Please bring the following with you to your appointment:    (1) Any X-Rays, CTs or MRIs which have been performed.  Contact the facility where they were done to arrange for  prior to your scheduled appointment.   (2) List of current medications  (3) This referral request   (4) Any documents/labs given to you for this referral                  Who to contact     If you have questions or need follow up information about today's clinic visit or your schedule please contact [facility] directly at [phone].  Normal or non-critical lab and imaging results will be communicated to you by [facility]  "letter or phone within 4 business days after the clinic has received the results. If you do not hear from us within 7 days, please contact the clinic through [facility] or phone. If you have a critical or abnormal lab result, we will notify you by phone as soon as possible.  Submit refill requests through [facility] or call your pharmacy and they will forward the refill request to us. Please allow 3 business days for your refill to be completed.          Additional Information About Your Visit        [facility] Information     [facility] lets you send messages to your doctor, view your test results, renew your prescriptions, schedule appointments and more. To sign up, go to [email or web address] . Click on \"Log in\" on the left side of the screen, which will take you to the Welcome page. Then click on \"Sign up Now\" on the right side of the page.     You will be asked to enter the access code listed below, as well as some personal information. Please follow the directions to create your username and password.     Your access code is: [email or web address]  Expires: 9/10/2018  4:30 PM     Your access code will  in 90 days. If you need help or a new code, please call your [place] clinic or [phone].        Care EveryWhere ID     This is your Care EveryWhere ID. This could be used by other organizations to access your [place] medical records  [id number]        Your Vitals Were     Pulse Temperature Height Pulse Oximetry BMI (Body Mass Index)       97 98.1  F (36.7  C) (Oral) 5' 5\" (1.651 m) 97% 31.55 kg/m2        Blood Pressure from Last 3 Encounters:   18 133/83   18 131/88   18 136/90    Weight from Last 3 Encounters:   18 189 lb 9.6 oz (86 kg)   18 191 lb 3.2 oz (86.7 kg)   18 190 lb (86.2 kg)              We Performed the Following     KOH prep (Other than skin, nails, hair)     OTOLARYNGOLOGY REFERRAL     Strep, Rapid Screen          Today's Medication Changes          These changes " are accurate as of 7/26/18  1:15 PM.  If you have any questions, ask your nurse or doctor.               Start taking these medicines.        Dose/Directions    amoxicillin-clavulanate 875-125 MG per tablet   Commonly known as:  AUGMENTIN   Used for:  Acute recurrent streptococcal tonsillitis   Started by:  Chari Andujar MD        Dose:  1 tablet   Take 1 tablet by mouth 2 times daily   Quantity:  20 tablet   Refills:  0       predniSONE 20 MG tablet   Commonly known as:  DELTASONE   Used for:  Acute recurrent streptococcal tonsillitis, Hypertrophy of tonsils   Started by:  Chari Andujar MD        Dose:  20 mg   Take 1 tablet (20 mg) by mouth daily   Quantity:  5 tablet   Refills:  0            Where to get your medicines      These medications were sent to [facility] [address] [place] [address] & [address]  [address] [place] [id number]     Phone:  [phone]     amoxicillin-clavulanate 875-125 MG per tablet    predniSONE 20 MG tablet                Primary Care Provider Office Phone # Fax #    Roman Fregoso [phone] [address] [address]        Equal Access to Services     KIARA PORTILLO : Masha deweyo Soerikali, waaxda luqadaha, qaybta kaalmada adeegyada, guerrero rausch. So [facility] [phone].    ATENCIÓN: Si habla español, tiene a abreu disposición servicios gratuitos de asistencia lingüística. Jennifer al [phone].    We comply with applicable federal civil rights laws and [place] laws. We do not discriminate on the basis of race, color, national origin, age, disability, sex, sexual orientation, or gender identity.            Thank you!     Thank you for choosing [facility]  for your care. Our goal is always to provide you with excellent care. Hearing back from our patients is one way we can continue to improve our services. Please take a few minutes to  complete the written survey that you may receive in the mail after your visit with us. Thank you!             Your Updated Medication List - Protect others around you: Learn how to safely use, store and throw away your medicines at www.disposemymeds.org.          This list is accurate as of 7/26/18  1:15 PM.  Always use your most recent med list.                   Brand Name Dispense Instructions for use Diagnosis    amoxicillin-clavulanate 875-125 MG per tablet    AUGMENTIN    20 tablet    Take 1 tablet by mouth 2 times daily    Acute recurrent streptococcal tonsillitis       buPROPion 300 MG 24 hr tablet    WELLBUTRIN XL    90 tablet    Take 1 tablet (300 mg) by mouth every morning    Depression with anxiety       cyclobenzaprine 10 MG tablet    FLEXERIL    30 tablet    Take 0.5-1 tablets (5-10 mg) by mouth 3 times daily as needed for muscle spasms    Neck sprain, initial encounter       levonorgestrel 20 MCG/24HR IUD    MIRENA     1 each by Intrauterine route once        penicillin V potassium 500 MG tablet    VEETID    40 tablet    Take 1 tablet (500 mg) by mouth 4 times daily    Acute non-recurrent streptococcal tonsillitis       predniSONE 20 MG tablet    DELTASONE    5 tablet    Take 1 tablet (20 mg) by mouth daily    Acute recurrent streptococcal tonsillitis, Hypertrophy of tonsils       sertraline 50 MG tablet    ZOLOFT    90 tablet    Take 1 tablet (50 mg) by mouth daily    Depression with anxiety       TRAZODONE HCL PO      Take 50 mg by mouth At Bedtime 1-2 tabs as needed for insomnia

## 2018-07-26 NOTE — TELEPHONE ENCOUNTER
Sore throat is back and she is wanting a prescription called in for antibiotics. She was initially given an injection. I connected her with the [facility] clinic where she was seen.  Jolynn Martínez RN-[facility]

## 2018-07-26 NOTE — TELEPHONE ENCOUNTER
Returned pt's call:     Called to inquire if she was given PO Penicillin as in MAR/OV note, or IM injection as in note from Nurse Advisor Note this AM (7/26 Telephone Encounter)    No answer, unable to leave VM. Will pineda as Recall.     Malini ROMERO RN

## 2018-07-26 NOTE — TELEPHONE ENCOUNTER
Reason for Call:  Other prescription    Detailed comments: Pt called in wondering if she could get a different antibiotic prescribed due the one she just finished didn't work for her strep...    She would like to get this prescription sent to [facility] [id number] [place] [address] & [address]    Please call pt to confirm that this was sent or with advise    Phone Number Patient can be reached at: Home number on file [phone] (home)    Best Time: Any time    Can we leave a detailed message on this number? YES    Call taken on 7/26/2018 at 8:35 AM by Mihaela Perdomo

## 2018-07-26 NOTE — LETTER
For : Josué        Re; Leana Ivey  ; 1990      Leana Ivey Seen in clinic today  Please excuse from  Work 18    Thanks        Chari Andujar MD  [facility]  [phone]

## 2018-07-26 NOTE — PROGRESS NOTES
"  SUBJECTIVE:   Leana Ivey is a 27 year old female who presents to clinic today for the following health issues:      Sore throat, did have pos strep 07/16/2018      Duration: started again yesterday. The first epsiode 7/16- did respond to antibiotics      Description (location/character/radiation): throat    Intensity:  moderate    Accompanying signs and symptoms: did have 4 missed doses of abx this wknd    History (similar episodes/previous evaluation): seen 07/16    Precipitating or alleviating factors: None    Therapies tried and outcome: abx with missed doses       PROBLEMS TO ADD ON...noticeble white patches in mouth  Some diarrhea - better today      Problem list and histories reviewed & adjusted, as indicated.  Additional history: as documented    Patient Active Problem List   Diagnosis     Depression with anxiety     Persistent disorder of initiating or maintaining sleep     Weight gain     No past surgical history on file.    Social History   Substance Use Topics     Smoking status: Light Tobacco Smoker     Smokeless tobacco: Never Used     Alcohol use Yes     No family history on file.        Reviewed and updated as needed this visit by clinical staff       Reviewed and updated as needed this visit by Provider         ROS:  Constitutional, HEENT, cardiovascular, pulmonary, gi and gu systems are negative, except as otherwise noted.    OBJECTIVE:     /83  Pulse 97  Temp 98.1  F (36.7  C) (Oral)  Ht 5' 5\" (1.651 m)  Wt 189 lb 9.6 oz (86 kg)  SpO2 97%  BMI 31.55 kg/m2  Body mass index is 31.55 kg/(m^2).  GENERAL: healthy, alert and no distress  EYES: Eyes grossly normal to inspection, PERRL and conjunctivae and sclerae normal  HENT: normal cephalic/atraumatic, ear canals and TM's normal, nose and mouth without ulcers or lesions, tonsillar hypertrophy, tonsillar erythema and tonsillar exudate  NECK: no adenopathy, no asymmetry, masses, or scars and thyroid normal to palpation  RESP: lungs " clear to auscultation - no rales, rhonchi or wheezes  CV: regular rate and rhythm, normal S1 S2, no S3 or S4, no murmur, click or rub, no peripheral edema and peripheral pulses strong  ABDOMEN: soft, nontender, no hepatosplenomegaly, no masses and bowel sounds normal    Diagnostic Test Results:  No results found for this or any previous visit (from the past 24 hour(s)).    ASSESSMENT/PLAN:   1. Acute recurrent streptococcal tonsillitis  First episode treated on 7/1/18-with Pen VK  - Strep, Rapid Screen positive again. I sent a KOH- for oral thrush & that's negative for yeast    Medications & plan   - predniSONE (DELTASONE) 20 MG tablet; for acute pain and inflammation  Take 1 tablet (20 mg) by mouth daily  Dispense: 5 tablet; Refill: 0  - amoxicillin-clavulanate (AUGMENTIN) 875-125 MG per tablet; Take 1 tablet by mouth 2 times daily  For recurrent infection-Dispense: 20 tablet; Refill: 0  -changed antibiotic for better coverage. If another episode- should be on first or 2nd generation cephalosporin     -excused from  Work today  - OTOLARYNGOLOGY REFERRAL - 2nd episode , if any further- return office visit with provider and consider ENT consultation to consider tonsillectomy      2. Hypertrophy of tonsils    - predniSONE (DELTASONE) 20 MG tablet; Take 1 tablet (20 mg) by mouth daily  Dispense: 5 tablet; Refill: 0 for pain mainly  - OTOLARYNGOLOGY REFERRAL    3. Depression with anxiety  -no medications changes   -currently stable on zoloft 50 mg once daily       Potential medication side effects were discussed with the patient; let me know if any occur.    Chari Andujar MD  [facility]

## 2018-08-09 ENCOUNTER — OFFICE VISIT (OUTPATIENT)
Dept: FAMILY MEDICINE | Facility: CLINIC | Age: 28
End: 2018-08-09
Payer: COMMERCIAL

## 2018-08-09 VITALS
BODY MASS INDEX: 32.32 KG/M2 | RESPIRATION RATE: 14 BRPM | SYSTOLIC BLOOD PRESSURE: 131 MMHG | OXYGEN SATURATION: 97 % | HEIGHT: 65 IN | TEMPERATURE: 98.1 F | WEIGHT: 194 LBS | HEART RATE: 78 BPM | DIASTOLIC BLOOD PRESSURE: 91 MMHG

## 2018-08-09 DIAGNOSIS — R03.0 ELEVATED BLOOD PRESSURE READING WITHOUT DIAGNOSIS OF HYPERTENSION: ICD-10-CM

## 2018-08-09 DIAGNOSIS — R07.0 THROAT PAIN: Primary | ICD-10-CM

## 2018-08-09 LAB
DEPRECATED S PYO AG THROAT QL EIA: NORMAL
SPECIMEN SOURCE: NORMAL

## 2018-08-09 PROCEDURE — 87081 CULTURE SCREEN ONLY: CPT | Performed by: PHYSICIAN ASSISTANT

## 2018-08-09 PROCEDURE — 99213 OFFICE O/P EST LOW 20 MIN: CPT | Performed by: PHYSICIAN ASSISTANT

## 2018-08-09 PROCEDURE — 87880 STREP A ASSAY W/OPTIC: CPT | Performed by: PHYSICIAN ASSISTANT

## 2018-08-09 NOTE — LETTER
[facility]  [address] [place]  [facility] [address]  Phone: [phone]    August 9, 2018        Leana Ivey  [address]   [facility] [address]          To whom it may concern:    RE: Leana Ivey    Patient was seen and treated today at our clinic and missed work.    Please contact me for questions or concerns.      Sincerely,        Eduardo Montero PA-C

## 2018-08-09 NOTE — MR AVS SNAPSHOT
"              After Visit Summary   2018    Leana Ivey    MRN: [id number]           Patient Information     Date Of Birth          1990        Visit Information        Provider Department      2018 9:00 AM Eduardo Montero PA-C [facility]        Today's Diagnoses     Throat pain    -  1    Elevated blood pressure reading without diagnosis of hypertension           Follow-ups after your visit        Follow-up notes from your care team     Return if symptoms worsen or fail to improve.      Who to contact     If you have questions or need follow up information about today's clinic visit or your schedule please contact [facility] directly at [phone].  Normal or non-critical lab and imaging results will be communicated to you by MyChart, letter or phone within 4 business days after the clinic has received the results. If you do not hear from us within 7 days, please contact the clinic through MyChart or phone. If you have a critical or abnormal lab result, we will notify you by phone as soon as possible.  Submit refill requests through [employer] or call your pharmacy and they will forward the refill request to us. Please allow 3 business days for your refill to be completed.          Additional Information About Your Visit        MyChart Information     [employer] lets you send messages to your doctor, view your test results, renew your prescriptions, schedule appointments and more. To sign up, go to [email or web address] . Click on \"Log in\" on the left side of the screen, which will take you to the Welcome page. Then click on \"Sign up Now\" on the right side of the page.     You will be asked to enter the access code listed below, as well as some personal information. Please follow the directions to create your username and password.     Your access code is: [email or web address]  Expires: 9/10/2018  4:30 PM     Your access code will  in 90 days. If you need help or a " "new code, please call your [place] clinic or [phone].        Care EveryWhere ID     This is your Care EveryWhere ID. This could be used by other organizations to access your [place] medical records  [id number]        Your Vitals Were     Pulse Temperature Respirations Height Pulse Oximetry Breastfeeding?    78 98.1  F (36.7  C) (Oral) 14 5' 5\" (1.651 m) 97% No    BMI (Body Mass Index)                   32.28 kg/m2            Blood Pressure from Last 3 Encounters:   08/09/18 (!) 131/91   07/26/18 133/83   07/16/18 131/88    Weight from Last 3 Encounters:   08/09/18 194 lb (88 kg)   07/26/18 189 lb 9.6 oz (86 kg)   07/16/18 191 lb 3.2 oz (86.7 kg)              We Performed the Following     Beta strep group A culture     Strep, Rapid Screen        Primary Care Provider Office Phone # Fax #    Roman Fregoso [phone] [address] [facility] [address]  [facility] [address]        Equal Access to Services     [facility]: Hadii aad ku hadashazam Sopiyush, waaxda luqadaha, qaybta kaalherbert nielsen, guerrero tsang . So [facility] [phone].    ATENCIÓN: Si habla español, tiene a abreu disposición servicios gratuitos de asistencia lingüística. [facility] [phone].    We comply with applicable federal civil rights laws and [place] laws. We do not discriminate on the basis of race, color, national origin, age, disability, sex, sexual orientation, or gender identity.            Thank you!     Thank you for choosing [facility]  for your care. Our goal is always to provide you with excellent care. Hearing back from our patients is one way we can continue to improve our services. Please take a few minutes to complete the written survey that you may receive in the mail after your visit with us. Thank you!             Your Updated Medication List - Protect others around you: Learn how to safely use, store and throw away your medicines at www.disposemymeds.org.        "   This list is accurate as of 8/9/18  9:11 AM.  Always use your most recent med list.                   Brand Name Dispense Instructions for use Diagnosis    amoxicillin-clavulanate 875-125 MG per tablet    AUGMENTIN    20 tablet    Take 1 tablet by mouth 2 times daily    Acute recurrent streptococcal tonsillitis       buPROPion 300 MG 24 hr tablet    WELLBUTRIN XL    90 tablet    Take 1 tablet (300 mg) by mouth every morning    Depression with anxiety       cyclobenzaprine 10 MG tablet    FLEXERIL    30 tablet    Take 0.5-1 tablets (5-10 mg) by mouth 3 times daily as needed for muscle spasms    Neck sprain, initial encounter       levonorgestrel 20 MCG/24HR IUD    MIRENA     1 each by Intrauterine route once        predniSONE 20 MG tablet    DELTASONE    5 tablet    Take 1 tablet (20 mg) by mouth daily    Acute recurrent streptococcal tonsillitis, Hypertrophy of tonsils       sertraline 50 MG tablet    ZOLOFT    90 tablet    Take 1 tablet (50 mg) by mouth daily    Depression with anxiety       TRAZODONE HCL PO      Take 50 mg by mouth At Bedtime 1-2 tabs as needed for insomnia

## 2018-08-09 NOTE — NURSING NOTE
"Chief Complaint   Patient presents with     Throat Pain       Initial BP (!) 131/91  Pulse 78  Temp 98.1  F (36.7  C) (Oral)  Resp 14  Ht 5' 5\" (1.651 m)  Wt 194 lb (88 kg)  SpO2 97%  Breastfeeding? No  BMI 32.28 kg/m2 Estimated body mass index is 32.28 kg/(m^2) as calculated from the following:    Height as of this encounter: 5' 5\" (1.651 m).    Weight as of this encounter: 194 lb (88 kg).  BP completed using cuff size: large    Health Maintenance that is potentially due pending provider review:  Health Maintenance Due   Topic Date Due     HPV IMMUNIZATION (1 of 3 - Female 3 Dose Series) 09/15/2003     TETANUS IMMUNIZATION (SYSTEM ASSIGNED)  09/15/2010     DEPRESSION ACTION PLAN Q1 YR  09/15/2010     HIV SCREEN (SYSTEM ASSIGNED)  09/15/2010     PAP SCREENING Q3 YR (SYSTEM ASSIGNED)  09/15/2013     [facility] at [facility]-pt knows to schedule      "

## 2018-08-10 LAB
BACTERIA SPEC CULT: NORMAL
SPECIMEN SOURCE: NORMAL

## 2018-08-24 ENCOUNTER — TRANSFERRED RECORDS (OUTPATIENT)
Dept: HEALTH INFORMATION MANAGEMENT | Facility: CLINIC | Age: 28
End: 2018-08-24

## 2018-08-24 LAB — PAP SMEAR - HIM PATIENT REPORTED: NEGATIVE

## 2018-09-04 ENCOUNTER — OFFICE VISIT (OUTPATIENT)
Dept: FAMILY MEDICINE | Facility: CLINIC | Age: 28
End: 2018-09-04
Payer: COMMERCIAL

## 2018-09-04 VITALS
RESPIRATION RATE: 16 BRPM | HEIGHT: 65 IN | HEART RATE: 86 BPM | BODY MASS INDEX: 33.49 KG/M2 | SYSTOLIC BLOOD PRESSURE: 136 MMHG | DIASTOLIC BLOOD PRESSURE: 89 MMHG | WEIGHT: 201 LBS | OXYGEN SATURATION: 98 % | TEMPERATURE: 98.4 F

## 2018-09-04 DIAGNOSIS — Z72.0 TOBACCO ABUSE: ICD-10-CM

## 2018-09-04 DIAGNOSIS — G47.09 OTHER INSOMNIA: ICD-10-CM

## 2018-09-04 DIAGNOSIS — F41.8 DEPRESSION WITH ANXIETY: Primary | ICD-10-CM

## 2018-09-04 DIAGNOSIS — B36.0 TINEA VERSICOLOR: ICD-10-CM

## 2018-09-04 DIAGNOSIS — B07.8 COMMON WART: ICD-10-CM

## 2018-09-04 PROCEDURE — 99214 OFFICE O/P EST MOD 30 MIN: CPT | Performed by: PHYSICIAN ASSISTANT

## 2018-09-04 RX ORDER — KETOCONAZOLE 20 MG/ML
SHAMPOO TOPICAL
Qty: 120 ML | Refills: 1 | Status: SHIPPED | OUTPATIENT
Start: 2018-09-04 | End: 2019-05-14

## 2018-09-04 RX ORDER — VARENICLINE TARTRATE 1 MG/1
1 TABLET, FILM COATED ORAL 2 TIMES DAILY
Qty: 56 TABLET | Refills: 2 | Status: SHIPPED | OUTPATIENT
Start: 2018-09-04 | End: 2019-05-14

## 2018-09-04 RX ORDER — BUPROPION HYDROCHLORIDE 300 MG/1
300 TABLET ORAL EVERY MORNING
Qty: 90 TABLET | Refills: 3 | Status: SHIPPED | OUTPATIENT
Start: 2018-09-04 | End: 2019-05-14

## 2018-09-04 RX ORDER — DOXEPIN 3 MG/1
3 TABLET, FILM COATED ORAL
Qty: 30 TABLET | Refills: 0 | Status: SHIPPED | OUTPATIENT
Start: 2018-09-04 | End: 2019-05-14

## 2018-09-04 ASSESSMENT — ANXIETY QUESTIONNAIRES
2. NOT BEING ABLE TO STOP OR CONTROL WORRYING: SEVERAL DAYS
3. WORRYING TOO MUCH ABOUT DIFFERENT THINGS: SEVERAL DAYS
1. FEELING NERVOUS, ANXIOUS, OR ON EDGE: SEVERAL DAYS
IF YOU CHECKED OFF ANY PROBLEMS ON THIS QUESTIONNAIRE, HOW DIFFICULT HAVE THESE PROBLEMS MADE IT FOR YOU TO DO YOUR WORK, TAKE CARE OF THINGS AT HOME, OR GET ALONG WITH OTHER PEOPLE: SOMEWHAT DIFFICULT
GAD7 TOTAL SCORE: 6
5. BEING SO RESTLESS THAT IT IS HARD TO SIT STILL: SEVERAL DAYS
6. BECOMING EASILY ANNOYED OR IRRITABLE: SEVERAL DAYS
7. FEELING AFRAID AS IF SOMETHING AWFUL MIGHT HAPPEN: SEVERAL DAYS

## 2018-09-04 ASSESSMENT — PATIENT HEALTH QUESTIONNAIRE - PHQ9: 5. POOR APPETITE OR OVEREATING: NOT AT ALL

## 2018-09-04 NOTE — PROGRESS NOTES
SUBJECTIVE:   Leana Ivey is a 27 year old female who presents to clinic today for the following health issues:      Depression, chantix and wart on left hand    Depression Followup    Status since last visit: Stable     See PHQ-9 for current symptoms.  Other associated symptoms: insomnia    Complicating factors:   Significant life event:  No   Current substance abuse:  None  Anxiety or Panic symptoms:  No    PHQ-9 1/12/2018 7/16/2018 9/4/2018   Total Score 19 5 -   Q9: Suicide Ideation Not at all Not at all Not at all     She has used trazodone in the past, but tends to have lingering effects in the am.  Would like to switch.  Has had trouble with initiating sleep for many years.  Never had evaluation from sleep health.      PHQ-9  English  PHQ-9   Any Language  Suicide Assessment Five-step Evaluation and Treatment (SAFE-T)    Problem list and histories reviewed & adjusted, as indicated.  Additional history: long history of tinea versicolor, has used OTC selsun blue that does not quite take care of.  Wondering what other options are available.  It does give her a mild itch.      She has recently quit smoking in the last month, used starter month of chantix, would like to continue, no real side effects.    Has wart on right thumb for last year or more.  No treatment.    BP Readings from Last 3 Encounters:   09/04/18 136/89   08/09/18 (!) 131/91   07/26/18 133/83    Wt Readings from Last 3 Encounters:   09/04/18 201 lb (91.2 kg)   08/09/18 194 lb (88 kg)   07/26/18 189 lb 9.6 oz (86 kg)                    Reviewed and updated as needed this visit by clinical staff  Tobacco  Meds  Soc Hx      Reviewed and updated as needed this visit by Provider         ROS:  Constitutional, HEENT, cardiovascular, pulmonary, gi and gu systems are negative, except as otherwise noted.    OBJECTIVE:     /89 (BP Location: Right arm, Cuff Size: Adult Regular)  Pulse 86  Temp 98.4  F (36.9  C) (Oral)  Resp 16  Ht 5'  "5\" (1.651 m)  Wt 201 lb (91.2 kg)  SpO2 98%  BMI 33.45 kg/m2  Body mass index is 33.45 kg/(m^2).  GENERAL: alert and no distress  EYES: Eyes grossly normal to inspection  HENT: ear canals and TM's normal, nose and mouth without ulcers or lesions  RESP: lungs clear to auscultation - no rales, rhonchi or wheezes  CV: regular rate and rhythm, normal S1 S2, no S3 or S4, no murmur, click or rub, no peripheral edema and peripheral pulses strong  SKIN: common wart over right thumb.  Hypopigmented macules back.  PSYCH: mentation appears normal, affect normal/bright    Diagnostic Test Results:  none     ASSESSMENT/PLAN:             1. Depression with anxiety  Stable.  - buPROPion (WELLBUTRIN XL) 300 MG 24 hr tablet; Take 1 tablet (300 mg) by mouth every morning  Dispense: 90 tablet; Refill: 3  - sertraline (ZOLOFT) 50 MG tablet; Take 1 tablet (50 mg) by mouth daily  Dispense: 90 tablet; Refill: 3    2. Tinea versicolor    - ketoconazole (NIZORAL) 2 % shampoo; Apply to the affected area and wash off after 5 minutes.  Dispense: 120 mL; Refill: 1    3. Tobacco abuse    - varenicline (CHANTIX) 1 MG tablet; Take 1 tablet (1 mg) by mouth 2 times daily  Dispense: 56 tablet; Refill: 2    4. Other insomnia  Will trial lower dose medication to see if she has less side effects in am.  If symptoms persist or worsen, may look to refer to sleep health for medical options due to concurrent medications.  - doxepin (SILENOR) 3 MG tablet; Take 1 tablet (3 mg) by mouth nightly as needed for sleep  Dispense: 30 tablet; Refill: 0    5. Common wart  OTC treatments discussed.        Eduardo Montero PA-C  [facility]    "

## 2018-09-04 NOTE — LETTER
My Depression Action Plan  Name: Leana Ivey   Date of Birth 1990  Date: 9/4/2018    My doctor: Roman Fregoso   My clinic: [facility]  [address] [facility] [id number] [phone]          GREEN    ZONE   Good Control    What it looks like:     Things are going generally well. You have normal up s and down s. You may even feel depressed from time to time, but bad moods usually last less than a day.   What you need to do:  1. Continue to care for yourself (see self care plan)  2. Check your depression survival kit and update it as needed  3. Follow your physician s recommendations including any medication.  4. Do not stop taking medication unless you consult with your physician first.           YELLOW         ZONE Getting Worse    What it looks like:     Depression is starting to interfere with your life.     It may be hard to get out of bed; you may be starting to isolate yourself from others.    Symptoms of depression are starting to last most all day and this has happened for several days.     You may have suicidal thoughts but they are not constant.   What you need to do:     1. Call your care team, your response to treatment will improve if you keep your care team informed of your progress. Yellow periods are signs an adjustment may need to be made.     2. Continue your self-care, even if you have to fake it!    3. Talk to someone in your support network    4. Open up your depression survival kit           RED    ZONE Medical Alert - Get Help    What it looks like:     Depression is seriously interfering with your life.     You may experience these or other symptoms: You can t get out of bed most days, can t work or engage in other necessary activities, you have trouble taking care of basic hygiene, or basic responsibilities, thoughts of suicide or death that will not go away, self-injurious behavior.     What you need to do:  1. Call your care team  and request a same-day appointment. If they are not available (weekends or after hours) call your local crisis line, emergency room or 911.            Depression Self Care Plan / Survival Kit    Self-Care for Depression  Here s the deal. Your body and mind are really not as separate as most people think.  What you do and think affects how you feel and how you feel influences what you do and think. This means if you do things that people who feel good do, it will help you feel better.  Sometimes this is all it takes.  There is also a place for medication and therapy depending on how severe your depression is, so be sure to consult with your medical provider and/ or Behavioral Health Consultant if your symptoms are worsening or not improving.     In order to better manage my stress, I will:    Exercise  Get some form of exercise, every day. This will help reduce pain and release endorphins, the  feel good  chemicals in your brain. This is almost as good as taking antidepressants!  This is not the same as joining a gym and then never going! (they count on that by the way ) It can be as simple as just going for a walk or doing some gardening, anything that will get you moving.      Hygiene   Maintain good hygiene (Get out of bed in the morning, Make your bed, Brush your teeth, Take a shower, and Get dressed like you were going to work, even if you are unemployed).  If your clothes don't fit try to get ones that do.    Diet  I will strive to eat foods that are good for me, drink plenty of water, and avoid excessive sugar, caffeine, alcohol, and other mood-altering substances.  Some foods that are helpful in depression are: complex carbohydrates, B vitamins, flaxseed, fish or fish oil, fresh fruits and vegetables.    Psychotherapy  I agree to participate in Individual Therapy (if recommended).    Medication  If prescribed medications, I agree to take them.  Missing doses can result in serious side effects.  I understand  that drinking alcohol, or other illicit drug use, may cause potential side effects.  I will not stop my medication abruptly without first discussing it with my provider.    Staying Connected With Others  I will stay in touch with my friends, family members, and my primary care provider/team.    Use your imagination  Be creative.  We all have a creative side; it doesn t matter if it s oil painting, sand castles, or mud pies! This will also kick up the endorphins.    Witness Beauty  (AKA stop and smell the roses) Take a look outside, even in mid-winter. Notice colors, textures. Watch the squirrels and birds.     Service to others  Be of service to others.  There is always someone else in need.  By helping others we can  get out of ourselves  and remember the really important things.  This also provides opportunities for practicing all the other parts of the program.    Humor  Laugh and be silly!  Adjust your TV habits for less news and crime-drama and more comedy.    Control your stress  Try breathing deep, massage therapy, biofeedback, and meditation. Find time to relax each day.     My support system    Clinic Contact:  Phone number:    Contact 1:  Phone number:    Contact 2:  Phone number:    Rastafari/:  Phone number:    Therapist:  Phone number:    Local crisis center:    Phone number:    Other community support:  Phone number:

## 2018-09-04 NOTE — NURSING NOTE
"Chief Complaint   Patient presents with     Derm Problem     left hand wart     Depression     Smoking Cessation     initial /89 (BP Location: Right arm, Cuff Size: Adult Regular)  Pulse 86  Temp 98.4  F (36.9  C) (Oral)  Resp 16  Ht 5' 5\" (1.651 m)  Wt 201 lb (91.2 kg)  SpO2 98%  BMI 33.45 kg/m2 Estimated body mass index is 33.45 kg/(m^2) as calculated from the following:    Height as of this encounter: 5' 5\" (1.651 m).    Weight as of this encounter: 201 lb (91.2 kg).  BP completed using cuff size: regular.   R arm      Health Maintenance that is potentially due pending provider review:  NONE    n/a    Bob Stephenson ma  "

## 2018-09-04 NOTE — Clinical Note
Please abstract the following data from this visit with this patient into the appropriate field in Epic:  Pap smear done on this date: 8/24/18 (approximately), by this group: [facility], results were normal.

## 2018-09-04 NOTE — MR AVS SNAPSHOT
"              After Visit Summary   2018    Leana Ivey    MRN: [id number]           Patient Information     Date Of Birth          1990        Visit Information        Provider Department      2018 8:00 AM Eduardo Montero PA-C [facility]        Today's Diagnoses     Depression with anxiety    -  1    Tinea versicolor        Tobacco abuse        Other insomnia           Follow-ups after your visit        Who to contact     If you have questions or need follow up information about today's clinic visit or your schedule please contact [facility] directly at [phone].  Normal or non-critical lab and imaging results will be communicated to you by [employer], letter or phone within 4 business days after the clinic has received the results. If you do not hear from us within 7 days, please contact the clinic through [employer] or phone. If you have a critical or abnormal lab result, we will notify you by phone as soon as possible.  Submit refill requests through [employer] or call your pharmacy and they will forward the refill request to us. Please allow 3 business days for your refill to be completed.          Additional Information About Your Visit        MyChart Information     [employer] lets you send messages to your doctor, view your test results, renew your prescriptions, schedule appointments and more. To sign up, go to [email or web address] . Click on \"Log in\" on the left side of the screen, which will take you to the Welcome page. Then click on \"Sign up Now\" on the right side of the page.     You will be asked to enter the access code listed below, as well as some personal information. Please follow the directions to create your username and password.     Your access code is: [email or web address]  Expires: 9/10/2018  4:30 PM     Your access code will  in 90 days. If you need help or a new code, please call your [facility] or [phone].        Care EveryWhere ID " "    This is your Care EveryWhere ID. This could be used by other organizations to access your [place] medical records  [id number]        Your Vitals Were     Pulse Temperature Respirations Height Pulse Oximetry BMI (Body Mass Index)    86 98.4  F (36.9  C) (Oral) 16 5' 5\" (1.651 m) 98% 33.45 kg/m2       Blood Pressure from Last 3 Encounters:   09/04/18 136/89   08/09/18 (!) 131/91   07/26/18 133/83    Weight from Last 3 Encounters:   09/04/18 201 lb (91.2 kg)   08/09/18 194 lb (88 kg)   07/26/18 189 lb 9.6 oz (86 kg)              Today, you had the following     No orders found for display         Today's Medication Changes          These changes are accurate as of 9/4/18  8:32 AM.  If you have any questions, ask your nurse or doctor.               Start taking these medicines.        Dose/Directions    ketoconazole 2 % shampoo   Commonly known as:  NIZORAL   Used for:  Tinea versicolor   Started by:  Eduardo Montero PA-C        Apply to the affected area and wash off after 5 minutes.   Quantity:  120 mL   Refills:  1       varenicline 1 MG tablet   Commonly known as:  CHANTIX   Used for:  Tobacco abuse   Started by:  Eduardo Montero PA-C        Dose:  1 mg   Take 1 tablet (1 mg) by mouth 2 times daily   Quantity:  56 tablet   Refills:  2            Where to get your medicines      These medications were sent to [facility] [address] [place] [address] & [address]  [address] [place] [id number]     Phone:  [phone]     buPROPion 300 MG 24 hr tablet    ketoconazole 2 % shampoo    sertraline 50 MG tablet    varenicline 1 MG tablet                Primary Care Provider Office Phone # Fax #    Roman Fregoso [phone] [address] [place] [address] [place] [phone]        Equal Access to Services     KIARA PORTILLO AH: Masha Mancera, jd wright, guerrero fortune la'aan " ah. So [facility] [phone].    ATENCIÓN: Si ngozi fine, tiene a abreu disposición servicios gratuitos de asistencia lingüística. Jennifer willis [phone].    We comply with applicable federal civil rights laws and [place] laws. We do not discriminate on the basis of race, color, national origin, age, disability, sex, sexual orientation, or gender identity.            Thank you!     Thank you for choosing [facility]  for your care. Our goal is always to provide you with excellent care. Hearing back from our patients is one way we can continue to improve our services. Please take a few minutes to complete the written survey that you may receive in the mail after your visit with us. Thank you!             Your Updated Medication List - Protect others around you: Learn how to safely use, store and throw away your medicines at www.disposemymeds.org.          This list is accurate as of 9/4/18  8:32 AM.  Always use your most recent med list.                   Brand Name Dispense Instructions for use Diagnosis    buPROPion 300 MG 24 hr tablet    WELLBUTRIN XL    90 tablet    Take 1 tablet (300 mg) by mouth every morning    Depression with anxiety       ketoconazole 2 % shampoo    NIZORAL    120 mL    Apply to the affected area and wash off after 5 minutes.    Tinea versicolor       levonorgestrel 20 MCG/24HR IUD    MIRENA     1 each by Intrauterine route once        sertraline 50 MG tablet    ZOLOFT    90 tablet    Take 1 tablet (50 mg) by mouth daily    Depression with anxiety       varenicline 1 MG tablet    CHANTIX    56 tablet    Take 1 tablet (1 mg) by mouth 2 times daily    Tobacco abuse

## 2018-09-06 ASSESSMENT — ANXIETY QUESTIONNAIRES: GAD7 TOTAL SCORE: 6

## 2018-11-08 ENCOUNTER — OFFICE VISIT (OUTPATIENT)
Dept: FAMILY MEDICINE | Facility: CLINIC | Age: 28
End: 2018-11-08
Payer: COMMERCIAL

## 2018-11-08 VITALS
DIASTOLIC BLOOD PRESSURE: 89 MMHG | RESPIRATION RATE: 16 BRPM | SYSTOLIC BLOOD PRESSURE: 140 MMHG | WEIGHT: 204 LBS | HEART RATE: 74 BPM | BODY MASS INDEX: 33.99 KG/M2 | TEMPERATURE: 98.3 F | OXYGEN SATURATION: 99 % | HEIGHT: 65 IN

## 2018-11-08 DIAGNOSIS — R07.0 THROAT PAIN: Primary | ICD-10-CM

## 2018-11-08 LAB
DEPRECATED S PYO AG THROAT QL EIA: NORMAL
SPECIMEN SOURCE: NORMAL

## 2018-11-08 PROCEDURE — 99213 OFFICE O/P EST LOW 20 MIN: CPT | Performed by: PHYSICIAN ASSISTANT

## 2018-11-08 PROCEDURE — 87880 STREP A ASSAY W/OPTIC: CPT | Performed by: PHYSICIAN ASSISTANT

## 2018-11-08 PROCEDURE — 87081 CULTURE SCREEN ONLY: CPT | Performed by: PHYSICIAN ASSISTANT

## 2018-11-08 NOTE — MR AVS SNAPSHOT
"              After Visit Summary   11/8/2018    Leana Ivey    MRN: [id number]           Patient Information     Date Of Birth          1990        Visit Information        Provider Department      11/8/2018 7:40 AM Eduardo Montero PA-C [facility]        Today's Diagnoses     Throat pain    -  1       Follow-ups after your visit        Follow-up notes from your care team     Return in about 3 months (around 2/8/2019).      Who to contact     If you have questions or need follow up information about today's clinic visit or your schedule please contact [facility] directly at [phone].  Normal or non-critical lab and imaging results will be communicated to you by MyChart, letter or phone within 4 business days after the clinic has received the results. If you do not hear from us within 7 days, please contact the clinic through MyChart or phone. If you have a critical or abnormal lab result, we will notify you by phone as soon as possible.  Submit refill requests through [employer] or call your pharmacy and they will forward the refill request to us. Please allow 3 business days for your refill to be completed.          Additional Information About Your Visit        Care EveryWhere ID     This is your Care EveryWhere ID. This could be used by other organizations to access your [place] medical records  [id number]        Your Vitals Were     Pulse Temperature Respirations Height Pulse Oximetry BMI (Body Mass Index)    74 98.3  F (36.8  C) (Oral) 16 5' 5\" (1.651 m) 99% 33.95 kg/m2       Blood Pressure from Last 3 Encounters:   11/08/18 140/89   09/04/18 136/89   08/09/18 (!) 131/91    Weight from Last 3 Encounters:   11/08/18 204 lb (92.5 kg)   09/04/18 201 lb (91.2 kg)   08/09/18 194 lb (88 kg)              We Performed the Following     Beta strep group A culture     Strep, Rapid Screen        Primary Care Provider Office Phone # Fax #    Roman Fregoso [phone] " [phone]       [address] [facility] [address]  [place] [address]        Equal Access to Services     KIARA PORTILLO : Hadii elana jewell kamila Mancera, washayda luqcarlos, qaybta kakristada gia, guerrero domenicain hayaan roxjun alas ladeedeetonja rausch. So [facility] [phone].    ATENCIÓN: Si habla español, tiene a abreu disposición servicios gratuitos de asistencia lingüística. Jennifer al [phone].    We comply with applicable federal civil rights laws and [place] laws. We do not discriminate on the basis of race, color, national origin, age, disability, sex, sexual orientation, or gender identity.            Thank you!     Thank you for choosing [facility]  for your care. Our goal is always to provide you with excellent care. Hearing back from our patients is one way we can continue to improve our services. Please take a few minutes to complete the written survey that you may receive in the mail after your visit with us. Thank you!             Your Updated Medication List - Protect others around you: Learn how to safely use, store and throw away your medicines at www.disposemymeds.org.          This list is accurate as of 11/8/18  8:02 AM.  Always use your most recent med list.                   Brand Name Dispense Instructions for use Diagnosis    buPROPion 300 MG 24 hr tablet    WELLBUTRIN XL    90 tablet    Take 1 tablet (300 mg) by mouth every morning    Depression with anxiety       doxepin 3 MG tablet    SILENOR    30 tablet    Take 1 tablet (3 mg) by mouth nightly as needed for sleep    Other insomnia       ketoconazole 2 % shampoo    NIZORAL    120 mL    Apply to the affected area and wash off after 5 minutes.    Tinea versicolor       levonorgestrel 20 MCG/24HR IUD    MIRENA     1 each by Intrauterine route once        sertraline 50 MG tablet    ZOLOFT    90 tablet    Take 1 tablet (50 mg) by mouth daily    Depression with anxiety       varenicline 1 MG tablet    CHANTIX    56 tablet    Take 1 tablet (1 mg) by  mouth 2 times daily    Tobacco abuse

## 2018-11-08 NOTE — PROGRESS NOTES
"  SUBJECTIVE:   Leana Ivey is a 28 year old female who presents to clinic today for the following health issues:      RESPIRATORY SYMPTOMS      Duration: since Monday     Description  sore throat    Severity: moderate    Accompanying signs and symptoms: None    History (predisposing factors):  none    Precipitating or alleviating factors: None    Therapies tried and outcome:  none          Problem list and histories reviewed & adjusted, as indicated.  Additional history: 29 y/o female c/o not feeling well for the last 4 days.  Admits to the above symptoms with ST being the worst.     BP Readings from Last 3 Encounters:   11/08/18 140/89   09/04/18 136/89   08/09/18 (!) 131/91    Wt Readings from Last 3 Encounters:   11/08/18 204 lb (92.5 kg)   09/04/18 201 lb (91.2 kg)   08/09/18 194 lb (88 kg)                    Reviewed and updated as needed this visit by clinical staff       Reviewed and updated as needed this visit by Provider         ROS:  Constitutional, HEENT, cardiovascular, pulmonary, gi and gu systems are negative, except as otherwise noted.    OBJECTIVE:     /89  Pulse 74  Temp 98.3  F (36.8  C) (Oral)  Resp 16  Ht 5' 5\" (1.651 m)  Wt 204 lb (92.5 kg)  SpO2 99%  BMI 33.95 kg/m2  Body mass index is 33.95 kg/(m^2).   GENERAL: alert and no distress  EYES: Eyes grossly normal to inspection  HENT: normal cephalic/atraumatic, ear canals and TM's normal, nose and mouth without ulcers or lesions, tonsillar hypertrophy and tonsillar exudate  NECK: no adenopathy, no asymmetry, masses, or scars and thyroid normal to palpation  RESP: lungs clear to auscultation - no rales, rhonchi or wheezes  CV: regular rate and rhythm, normal S1 S2, no S3 or S4, no murmur, click or rub, no peripheral edema and peripheral pulses strong  PSYCH: mentation appears normal, affect normal/bright    Diagnostic Test Results:  Results for orders placed or performed in visit on 11/08/18 (from the past 24 hour(s)) "   Strep, Rapid Screen   Result Value Ref Range    Specimen Description Throat     Rapid Strep A Screen       NEGATIVE: No Group A streptococcal antigen detected by immunoassay, await culture report.       ASSESSMENT:       PLAN:   1. Throat pain  Most likely viral illness.  Supportive care.  Will contact with culture results.    - Strep, Rapid Screen  - Beta strep group A culture        Follow up if symptoms persist or worsen     Eduardo Montero PA-C  [facility]

## 2018-11-08 NOTE — LETTER
[facility]  [address]  [facility] [address]  Phone: [phone]    November 8, 2018        Leana Ivey  [address]   [place] [address]          To whom it may concern:    RE: Leana Ivey    Patient was seen and treated today at our clinic and missed work.    Please contact me for questions or concerns.      Sincerely,        Eduardo Montero PA-C

## 2018-11-09 LAB
BACTERIA SPEC CULT: NORMAL
SPECIMEN SOURCE: NORMAL

## 2018-12-27 ENCOUNTER — OFFICE VISIT (OUTPATIENT)
Dept: FAMILY MEDICINE | Facility: CLINIC | Age: 28
End: 2018-12-27
Payer: COMMERCIAL

## 2018-12-27 VITALS
SYSTOLIC BLOOD PRESSURE: 120 MMHG | DIASTOLIC BLOOD PRESSURE: 82 MMHG | WEIGHT: 209.9 LBS | TEMPERATURE: 98.4 F | HEIGHT: 65 IN | OXYGEN SATURATION: 99 % | BODY MASS INDEX: 34.97 KG/M2 | HEART RATE: 86 BPM

## 2018-12-27 DIAGNOSIS — J02.9 PHARYNGITIS, UNSPECIFIED ETIOLOGY: Primary | ICD-10-CM

## 2018-12-27 DIAGNOSIS — R07.0 THROAT PAIN: ICD-10-CM

## 2018-12-27 LAB
DEPRECATED S PYO AG THROAT QL EIA: NORMAL
SPECIMEN SOURCE: NORMAL

## 2018-12-27 PROCEDURE — 87880 STREP A ASSAY W/OPTIC: CPT | Performed by: FAMILY MEDICINE

## 2018-12-27 PROCEDURE — 99213 OFFICE O/P EST LOW 20 MIN: CPT | Performed by: FAMILY MEDICINE

## 2018-12-27 PROCEDURE — 87081 CULTURE SCREEN ONLY: CPT | Performed by: FAMILY MEDICINE

## 2018-12-27 RX ORDER — AMOXICILLIN AND CLAVULANATE POTASSIUM 500; 125 MG/1; MG/1
1 TABLET, FILM COATED ORAL 2 TIMES DAILY
Qty: 20 TABLET | Refills: 0 | Status: SHIPPED | OUTPATIENT
Start: 2018-12-27 | End: 2019-05-14

## 2018-12-27 ASSESSMENT — MIFFLIN-ST. JEOR: SCORE: 1692.98

## 2018-12-27 NOTE — LETTER
December 27, 2018      Leana Ivey  [address] [facility]   [place] [address]        To Whom It May Concern:    Leana Ivey was seen in our clinic. She was not able to attend work today.   She may return to work without restrictions.       Sincerely,        Deanna Pederson MD

## 2018-12-27 NOTE — PROGRESS NOTES
"  SUBJECTIVE:   Leana Ivey is a 28 year old female who presents to clinic today for the following health issues:      Sore throat  Woke up this morning with throat pain.     The patient presents to clinic for evaluation of throat pain. Pain started earlier today. She is noticing progressive tonsillar swelling with white plaques on both tonsils.   Denies any fevers.       Problem list and histories reviewed & adjusted, as indicated.  Additional history: as documented    Patient Active Problem List   Diagnosis     Depression with anxiety     Persistent disorder of initiating or maintaining sleep     Weight gain     No past surgical history on file.    Social History     Tobacco Use     Smoking status: Former Smoker     Smokeless tobacco: Never Used   Substance Use Topics     Alcohol use: No     No family history on file.        Reviewed and updated as needed this visit by clinical staff  Allergies  Meds       Reviewed and updated as needed this visit by Provider         ROS:  Constitutional, HEENT, cardiovascular, pulmonary, gi and gu systems are negative, except as otherwise noted.    OBJECTIVE:     /82 (BP Location: Left arm, Patient Position: Sitting, Cuff Size: Adult Large)   Pulse 86   Temp 98.4  F (36.9  C) (Oral)   Ht 1.651 m (5' 5\")   Wt 95.2 kg (209 lb 14.4 oz)   SpO2 99%   BMI 34.93 kg/m    Body mass index is 34.93 kg/m .  GENERAL: healthy, alert and no distress  EYES: Eyes grossly normal to inspection, PERRL and conjunctivae and sclerae normal  HENT: Moderate to severe enlargement of both tonsils with significant white/gray exudates on both tonsils.  RESP: lungs clear to auscultation - no rales, rhonchi or wheezes  CV: regular rate and rhythm, normal S1 S2, no S3 or S4, no murmur, click or rub, no peripheral edema and peripheral pulses strong    Diagnostic Test Results:  Results for orders placed or performed in visit on 12/27/18 (from the past 24 hour(s))   Strep, Rapid Screen "   Result Value Ref Range    Specimen Description Throat     Rapid Strep A Screen       NEGATIVE: No Group A streptococcal antigen detected by immunoassay, await culture report.       ASSESSMENT/PLAN:   1. Pharyngitis, unspecified etiology  Assessment: Significant tonsillar enlargement with exudates on both tonsils.  Clinical presentation is highly consistent with streptococcal pharyngitis.  Due to the extent of the swelling and the exudates I would recommend proceeding with antibiotics.    Plan:  - amoxicillin-clavulanate (AUGMENTIN) 500-125 MG tablet; Take 1 tablet by mouth 2 times daily for 10 days  Dispense: 20 tablet; Refill: 0  - Strep, Rapid Screen  - Beta strep group A culture      Deanna Pederson MD  [facility]

## 2018-12-27 NOTE — PATIENT INSTRUCTIONS
Patient Education     Self-Care for Sore Throats    Sore throats happen for many reasons, such as colds, allergies, and infections caused by viruses or bacteria. In any case, your throat becomes red and sore. Your goal for self-care is to reduce your discomfort while giving your throat a chance to heal.  Moisten and soothe your throat  Tips include the following:    Try a sip of water first thing after waking up.    Keep your throat moist by drinking 6 or more glasses of clear liquids every day.    Run a cool-air humidifier in your room overnight.    Avoid cigarette smoke.     Suck on throat lozenges, cough drops, hard candy, ice chips, or frozen fruit-juice bars. Use the sugar-free versions if your diet or medical condition requires them.  Gargle to ease irritation  Gargling every hour or 2 can ease irritation. Try gargling with 1 of these solutions:    1/4 teaspoon of salt in 1/2 cup of warm water    An over-the-counter anesthetic gargle  Use medicine for more relief  Over-the-counter medicine can reduce sore throat symptoms. Ask your pharmacist if you have questions about which medicine to use:    Ease pain with anesthetic sprays. Aspirin or an aspirin substitute also helps. Remember, never give aspirin to anyone 18 or younger, or if you are already taking blood thinners.     For sore throats caused by allergies, try antihistamines to block the allergic reaction.    Remember: unless a sore throat is caused by a bacterial infection, antibiotics won t help you.  Prevent future sore throats  Prevention tips include the following:    Stop smoking or reduce contact with secondhand smoke. Smoke irritates the tender throat lining.    Limit contact with pets and with allergy-causing substances, such as pollen and mold.    When you re around someone with a sore throat or cold, wash your hands often to keep viruses or bacteria from spreading.    Don t strain your vocal cords.  Call your healthcare provider  Contact your  healthcare provider if you have:    A temperature over 101 F (38.3 C)    White spots on the throat    Great difficulty swallowing    Trouble breathing    A skin rash    Recent exposure to someone else with strep bacteria    Severe hoarseness and swollen glands in the neck or jaw   Date Last Reviewed: 8/1/2016 2000-2018 [employer]. [address] [place], [place] [address]. All rights reserved. This information is not intended as a substitute for professional medical care. Always follow your healthcare professional's instructions.

## 2018-12-28 LAB
BACTERIA SPEC CULT: NORMAL
SPECIMEN SOURCE: NORMAL

## 2019-01-14 NOTE — PROGRESS NOTES
"  SUBJECTIVE:   Leana Ivey is a 27 year old female who presents to clinic today for the following health issues:      Throat pain x3 weeks-had previously w/ ABX given - did not finish course        Problem list and histories reviewed & adjusted, as indicated.  Additional history: 28 y/o female here for follow up from recent strep infections.  Was initially diagnosed 7/16 completed course of amoxil.  Symptoms persisted 7/26, rapid positive again.  Treated with augmentin for about 5 days.  Just started to get a mild ST this am and has been more fatigued.    BP Readings from Last 3 Encounters:   08/09/18 (!) 131/91   07/26/18 133/83   07/16/18 131/88    Wt Readings from Last 3 Encounters:   08/09/18 194 lb (88 kg)   07/26/18 189 lb 9.6 oz (86 kg)   07/16/18 191 lb 3.2 oz (86.7 kg)                    Reviewed and updated as needed this visit by clinical staff  Tobacco  Allergies  Meds  Med Hx  Surg Hx  Fam Hx  Soc Hx      Reviewed and updated as needed this visit by Provider         ROS:  Constitutional, HEENT, cardiovascular, pulmonary, gi and gu systems are negative, except as otherwise noted.    OBJECTIVE:     BP (!) 131/91  Pulse 78  Temp 98.1  F (36.7  C) (Oral)  Resp 14  Ht 5' 5\" (1.651 m)  Wt 194 lb (88 kg)  SpO2 97%  Breastfeeding? No  BMI 32.28 kg/m2  Body mass index is 32.28 kg/(m^2).  GENERAL: alert and no distress  EYES: Eyes grossly normal to inspection  HENT: normal cephalic/atraumatic, ear canals and TM's normal, nose and mouth without ulcers or lesions, oropharynx clear and oral mucous membranes moist  NECK: bilateral anterior cervical adenopathy  RESP: lungs clear to auscultation - no rales, rhonchi or wheezes  CV: regular rate and rhythm, normal S1 S2, no S3 or S4, no murmur, click or rub, no peripheral edema and peripheral pulses strong  PSYCH: mentation appears normal, affect normal/bright    Diagnostic Test Results:  Results for orders placed or performed in visit on 08/09/18 " (from the past 24 hour(s))   Strep, Rapid Screen   Result Value Ref Range    Specimen Description Throat     Rapid Strep A Screen       NEGATIVE: No Group A streptococcal antigen detected by immunoassay, await culture report.       ASSESSMENT/PLAN:             1. Throat pain  Will await culture.  Discussed possibility of mono, she would like to hold off on testing for now.  - Strep, Rapid Screen  - Beta strep group A culture    2. Elevated blood pressure reading without diagnosis of hypertension  Dietary and lifestyle changes.      Follow up as needed.    Eduardo Montero PA-C  [facility]     Head injury

## 2019-05-14 ENCOUNTER — OFFICE VISIT (OUTPATIENT)
Dept: FAMILY MEDICINE | Facility: CLINIC | Age: 29
End: 2019-05-14
Payer: COMMERCIAL

## 2019-05-14 VITALS
RESPIRATION RATE: 16 BRPM | OXYGEN SATURATION: 95 % | DIASTOLIC BLOOD PRESSURE: 94 MMHG | SYSTOLIC BLOOD PRESSURE: 130 MMHG | HEART RATE: 85 BPM | HEIGHT: 66 IN | WEIGHT: 196 LBS | TEMPERATURE: 98.7 F | BODY MASS INDEX: 31.5 KG/M2

## 2019-05-14 DIAGNOSIS — F33.1 MODERATE EPISODE OF RECURRENT MAJOR DEPRESSIVE DISORDER (H): Primary | ICD-10-CM

## 2019-05-14 PROCEDURE — 99214 OFFICE O/P EST MOD 30 MIN: CPT | Performed by: PHYSICIAN ASSISTANT

## 2019-05-14 RX ORDER — ESCITALOPRAM OXALATE 10 MG/1
10 TABLET ORAL DAILY
Qty: 30 TABLET | Refills: 3 | Status: SHIPPED | OUTPATIENT
Start: 2019-05-14 | End: 2020-03-19

## 2019-05-14 ASSESSMENT — ANXIETY QUESTIONNAIRES
GAD7 TOTAL SCORE: 12
6. BECOMING EASILY ANNOYED OR IRRITABLE: SEVERAL DAYS
IF YOU CHECKED OFF ANY PROBLEMS ON THIS QUESTIONNAIRE, HOW DIFFICULT HAVE THESE PROBLEMS MADE IT FOR YOU TO DO YOUR WORK, TAKE CARE OF THINGS AT HOME, OR GET ALONG WITH OTHER PEOPLE: EXTREMELY DIFFICULT
2. NOT BEING ABLE TO STOP OR CONTROL WORRYING: NEARLY EVERY DAY
5. BEING SO RESTLESS THAT IT IS HARD TO SIT STILL: SEVERAL DAYS
3. WORRYING TOO MUCH ABOUT DIFFERENT THINGS: MORE THAN HALF THE DAYS
1. FEELING NERVOUS, ANXIOUS, OR ON EDGE: NEARLY EVERY DAY
7. FEELING AFRAID AS IF SOMETHING AWFUL MIGHT HAPPEN: NOT AT ALL

## 2019-05-14 ASSESSMENT — MIFFLIN-ST. JEOR: SCORE: 1637.86

## 2019-05-14 ASSESSMENT — PATIENT HEALTH QUESTIONNAIRE - PHQ9
SUM OF ALL RESPONSES TO PHQ QUESTIONS 1-9: 18
5. POOR APPETITE OR OVEREATING: MORE THAN HALF THE DAYS

## 2019-05-14 NOTE — LETTER
[facility]  [address]  [facility] [address]  Phone: [phone]    May 14, 2019        Leana Ivey  [address]  [facility] [address]          To whom it may concern:    RE: Leana Ivey    Patient was seen and treated today at our clinic and missed work 5/13 and 5/14    Please contact me for questions or concerns.      Sincerely,        Eduardo Montero PA-C

## 2019-05-14 NOTE — PROGRESS NOTES
SUBJECTIVE:   Leana Ivey is a 28 year old female who presents to clinic today for the following   health issues:      Wants to change depression meds    Depression Followup    Status since last visit: Worsened    See PHQ-9 for current symptoms.  Other associated symptoms: None    Complicating factors:   Significant life event:  No   Current substance abuse:  None  Anxiety or Panic symptoms:  No    PHQ 7/16/2018 9/4/2018 5/14/2019   PHQ-9 Total Score 5 - 18   Q9: Thoughts of better off dead/self-harm past 2 weeks Not at all Not at all Several days     In the past two weeks have you had thoughts of suicide or self-harm?  Yes  In the past two weeks have you thought of a plan or intent to harm yourself? No.  Do you have concerns about your personal safety or the safety of others?   No  PHQ-9  English  PHQ-9   Any Language  Suicide Assessment Five-step Evaluation and Treatment (SAFE-T)    Additional history: 27 y/o female here to discuss her depression.  Abut 3 months ago she was on zoloft and wellbutrin and feeling quite good.  Because of that she stopped the zoloft.  Was still doing ok so she stopped the wellbutrin.  Over the last month, symptoms have started to get worse.  She did try to restart the wellbutrin yesterday at 300 mg, and just felt off.  Was not able to make it to work.  She does admit to some thoughts of self harm but not plans, and describes them as not an options, and does not have any concerns about her safety.    Reviewed  and updated as needed this visit by clinical staff  Tobacco  Allergies  Meds  Soc Hx        Reviewed and updated as needed this visit by Provider         BP Readings from Last 3 Encounters:   05/14/19 (!) 130/94   12/27/18 120/82   11/08/18 140/89    Wt Readings from Last 3 Encounters:   05/14/19 88.9 kg (196 lb)   12/27/18 95.2 kg (209 lb 14.4 oz)   11/08/18 92.5 kg (204 lb)                    ROS:  Constitutional, HEENT, cardiovascular, pulmonary, gi and gu systems  "are negative, except as otherwise noted.    OBJECTIVE:     BP (!) 130/94 (BP Location: Right arm, Cuff Size: Adult Large)   Pulse 85   Temp 98.7  F (37.1  C) (Oral)   Resp 16   Ht 1.664 m (5' 5.5\")   Wt 88.9 kg (196 lb)   SpO2 95%   BMI 32.12 kg/m    Body mass index is 32.12 kg/m .  GENERAL: alert and no distress  EYES: Eyes grossly normal to inspection  RESP: lungs clear to auscultation - no rales, rhonchi or wheezes  CV: regular rate and rhythm, normal S1 S2, no S3 or S4, no murmur, click or rub, no peripheral edema and peripheral pulses strong  PSYCH: mentation appears normal, affect normal/bright    Diagnostic Test Results:  none     ASSESSMENT/PLAN:             1. Moderate episode of recurrent major depressive disorder (H)  Off work note provided.  We discuss options to re-start treatment.  She had success with zoloft but found that it took too many weeks to kick it.  We discussed some of the newer serotonin specific reuptake inhibitor and that they may have effect sooner, and she did want to try.   We discussed the potential benefits and side effects including making symptoms worse and she does understand.  She will contact us if her thoughts of self harm or suicide get worse.  She is interested in getting back into therapy., so will refer for that.    - escitalopram (LEXAPRO) 10 MG tablet; Take 1 tablet (10 mg) by mouth daily  Dispense: 30 tablet; Refill: 3  - MENTAL HEALTH REFERRAL  - Adult; Outpatient Treatment; Individual/Couples/Family/Group Therapy/Health Psychology; [facility]: [facility] [phone]; We will contact you to schedule the appointment or please call with any questions    Would like her to follow up by end of week to see how things are going either by phone or mychart.    Eduardo Montero PA-C  [facility]        "

## 2019-05-14 NOTE — NURSING NOTE
"Chief Complaint   Patient presents with     Depression     initial BP (!) 130/94 (BP Location: Right arm, Cuff Size: Adult Large)   Pulse 85   Temp 98.7  F (37.1  C) (Oral)   Resp 16   Ht 1.664 m (5' 5.5\")   Wt 88.9 kg (196 lb)   SpO2 95%   BMI 32.12 kg/m   Estimated body mass index is 32.12 kg/m  as calculated from the following:    Height as of this encounter: 1.664 m (5' 5.5\").    Weight as of this encounter: 88.9 kg (196 lb).  BP completed using cuff size: large.  R arm      Health Maintenance that is potentially due pending provider review:  NONE    n/a    Bob Stephenson ma  "

## 2019-05-15 ASSESSMENT — ANXIETY QUESTIONNAIRES: GAD7 TOTAL SCORE: 12

## 2019-11-03 ENCOUNTER — HEALTH MAINTENANCE LETTER (OUTPATIENT)
Age: 29
End: 2019-11-03

## 2020-03-19 ENCOUNTER — VIRTUAL VISIT (OUTPATIENT)
Dept: FAMILY MEDICINE | Facility: CLINIC | Age: 30
End: 2020-03-19
Payer: COMMERCIAL

## 2020-03-19 DIAGNOSIS — B36.0 TINEA VERSICOLOR: ICD-10-CM

## 2020-03-19 DIAGNOSIS — G47.00 PERSISTENT DISORDER OF INITIATING OR MAINTAINING SLEEP: ICD-10-CM

## 2020-03-19 DIAGNOSIS — F33.1 MODERATE EPISODE OF RECURRENT MAJOR DEPRESSIVE DISORDER (H): Primary | ICD-10-CM

## 2020-03-19 DIAGNOSIS — Z72.0 TOBACCO ABUSE: ICD-10-CM

## 2020-03-19 PROCEDURE — 99441 ZZC PHYSICIAN TELEPHONE EVALUATION 5-10 MIN: CPT | Performed by: PHYSICIAN ASSISTANT

## 2020-03-19 RX ORDER — TRAZODONE HYDROCHLORIDE 50 MG/1
50 TABLET, FILM COATED ORAL
Qty: 30 TABLET | Refills: 1 | Status: SHIPPED | OUTPATIENT
Start: 2020-03-19

## 2020-03-19 RX ORDER — KETOCONAZOLE 20 MG/G
CREAM TOPICAL DAILY
Qty: 30 G | Refills: 1 | Status: SHIPPED | OUTPATIENT
Start: 2020-03-19

## 2020-03-19 RX ORDER — BUPROPION HYDROCHLORIDE 150 MG/1
150 TABLET, EXTENDED RELEASE ORAL 2 TIMES DAILY
Qty: 60 TABLET | Refills: 3 | Status: SHIPPED | OUTPATIENT
Start: 2020-03-19

## 2020-03-19 RX ORDER — KETOCONAZOLE 20 MG/G
CREAM TOPICAL DAILY
COMMUNITY
End: 2020-03-19

## 2020-03-19 RX ORDER — BUPROPION HYDROCHLORIDE 150 MG/1
150 TABLET, EXTENDED RELEASE ORAL
COMMUNITY
Start: 2017-08-15 | End: 2020-03-19

## 2020-03-19 RX ORDER — VARENICLINE TARTRATE 0.5 MG/1
0.5 TABLET, FILM COATED ORAL 2 TIMES DAILY
Qty: 60 TABLET | Refills: 11 | Status: SHIPPED | OUTPATIENT
Start: 2020-03-19

## 2020-03-19 NOTE — PROGRESS NOTES
"Leana Ivey is a 29 year old female who is being evaluated via a telephone visit.      The patient has been notified of following (by MANISHA marshall     \"We have found that certain health care needs can be provided without the need for a physical exam.  This service lets us provide the care you need with a short phone conversation.  If a prescription is necessary we can send it directly to your pharmacy.  If lab work is needed we can place an order for that and you can then stop by our lab to have the test done at a later time.    This telephone visit will be conducted via 3 way call with the you (the patient) , the physician/provider, and a me all on the line at the same time.  This allows your physician/provider to have the phone conversation with you while I will be taking notes for your medical record.  We will have full access to your [facility] medical record during this entire phone call.    Since this is like an office visit,  will bill your insurance company for this service.  Please check with your medical insurance if this type of telephone/virtual is covered . You may be responsible for the cost of this service if insurance coverage is denied.  The typical cost is $30 (10min), $59(11-20min) and $85 (21-30min)     If during the course of the call the physician/provider feels a telephone visit is not appropriate, you will not be charged for this service\"    Consent has been obtained for this service by care team member: yes.  See the scanned image in the medical record.    S: The history as provided by the patient to the provider during this 3 way call include     Pertinent parts of the the patient's medical history reviewed and confirmed by the provider included :      Total time of call between patient and provider was 8 minutes     Yomi Montero PA-C  (MD signature)  ===================================================    I have reviewed the note as documented above.  This accurately captures the " substance of my conversation with the patient,    Additional provider notes:30 y/o to follow up on a couple of issues.  She has hx of depression, and at our last visit we did trial lexapro.  She took this, but she found that it may have been too strong.  Seemed to bring on a bit of anxiety.  She has been off all meds for the last 2 months.  She has had increase in stress and symptoms with now working from home and being part of social distancing.  Seems to make her depression worse.  She would like to go back on wellbutrin, worked well for her in the past.    She has also used prn trazodone for sleep.  She has not needed this for several years.  Has trouble initiating sleep.  Has tolerated well in the past.  Does only take 1-2 a week, otherwise she does find she does get some dependency.      She has also restarted smoking.  She did quit with success on chantix.  Would like to restart.    Hx of tinea versicolor, and has used ketoconazole with success in the past.    Assessment/Plan:    MDD- will restart wellbutrin.  Tolerated in the past.    Persistent disorder of initiating sleep.  rx of trazodone.  Would like her to wait 1-2 weeks before starting this to get used to the wellbutrin.    Tobacco abuse.  chantix sent.  Same with the above.  Would like her to wait until she is stable on wellbutrin prior to starting.  Has been on combination in the past and done fine.    Tinea versicolo.  Refill of ketoconazole sent.    Would like evisit/phone call in 2 weeks to see how things are going.

## 2020-04-23 DIAGNOSIS — B36.0 TINEA VERSICOLOR: ICD-10-CM

## 2020-04-24 RX ORDER — KETOCONAZOLE 20 MG/G
CREAM TOPICAL DAILY
Start: 2020-04-24

## 2020-04-24 NOTE — TELEPHONE ENCOUNTER
"Requested Prescriptions   Pending Prescriptions Disp Refills     ketoconazole (NIZORAL) 2 % external cream  Last Written Prescription Date:  3/19/2020  Last Fill Quantity: 30 g,  # refills: 1   Last office visit: 3/19/2020 with prescribing provider:  Kylah   Future Office Visit:     30 g 1     Sig: Apply topically daily       Antifungal Agents Passed - 4/23/2020  4:22 PM        Passed - Recent (12 mo) or future (30 days) visit within the authorizing provider's specialty     Patient has had an office visit with the authorizing provider or a provider within the authorizing providers department within the previous 12 mos or has a future within next 30 days. See \"Patient Info\" tab in inbasket, or \"Choose Columns\" in Meds & Orders section of the refill encounter.              Passed - Not Fluconazole or Terconazole      If oral Fluconazole or Terconazole, may refill if indicated in progress notes.           Passed - Medication is active on med list              "

## 2020-06-01 ENCOUNTER — VIRTUAL VISIT (OUTPATIENT)
Dept: FAMILY MEDICINE | Facility: CLINIC | Age: 30
End: 2020-06-01
Payer: COMMERCIAL

## 2020-06-01 DIAGNOSIS — L73.9 FOLLICULITIS: Primary | ICD-10-CM

## 2020-06-01 PROCEDURE — 99213 OFFICE O/P EST LOW 20 MIN: CPT | Mod: 95 | Performed by: FAMILY MEDICINE

## 2020-06-01 RX ORDER — MUPIROCIN 20 MG/G
OINTMENT TOPICAL 3 TIMES DAILY
Qty: 1 G | Refills: 0 | Status: SHIPPED | OUTPATIENT
Start: 2020-06-01

## 2020-06-01 ASSESSMENT — PATIENT HEALTH QUESTIONNAIRE - PHQ9: SUM OF ALL RESPONSES TO PHQ QUESTIONS 1-9: 8

## 2020-06-01 NOTE — PROGRESS NOTES
"Leana Ivey is a 29 year old female who is being evaluated via a billable video visit.      The patient has been notified of following:     \"This video visit will be conducted via a call between you and your physician/provider. We have found that certain health care needs can be provided without the need for an in-person physical exam.  This service lets us provide the care you need with a video conversation.  If a prescription is necessary we can send it directly to your pharmacy.  If lab work is needed we can place an order for that and you can then stop by our lab to have the test done at a later time.    Video visits are billed at different rates depending on your insurance coverage.  Please reach out to your insurance provider with any questions.    If during the course of the call the physician/provider feels a video visit is not appropriate, you will not be charged for this service.\"    Patient has given verbal consent for Video visit? Yes    How would you like to obtain your AVS? [facility]    Patient would like the video invitation sent by: Text to cell phone: [phone]    Will anyone else be joining your video visit? No      Subjective     Leana Ivey is a 29 year old female who presents today via video visit for the following health issues:    HPI  Belly button infection      Duration: 2 months    Description (location/character/radiation): belly button    Intensity:  moderate    Accompanying signs and symptoms:     History (similar episodes/previous evaluation):     Precipitating or alleviating factors:     Therapies tried and outcome:         Video Start Time: 12:14 PM    PROBLEMS TO ADD ON...    Patient Active Problem List   Diagnosis     Depression with anxiety     Persistent disorder of initiating or maintaining sleep     Weight gain     Moderate episode of recurrent major depressive disorder (H)     No past surgical history on file.    Social History     Tobacco Use     Smoking " "status: Former Smoker     Types: Other     Smokeless tobacco: Never Used     Tobacco comment: vape   Substance Use Topics     Alcohol use: No     No family history on file.        Reviewed and updated as needed this visit by Provider         Review of Systems   Constitutional, HEENT, cardiovascular, pulmonary, GI, , musculoskeletal, neuro, skin, endocrine and psych systems are negative, except as otherwise noted.      Objective    There were no vitals taken for this visit.  Estimated body mass index is 32.12 kg/m  as calculated from the following:    Height as of 5/14/19: 1.664 m (5' 5.5\").    Weight as of 5/14/19: 88.9 kg (196 lb).  Physical Exam     GENERAL: Healthy, alert and no distress  RESP: No audible wheeze, cough, or visible cyanosis.  No visible retractions or increased work of breathing.    SKIN: Visible skin clear. No significant rash, abnormal pigmentation or lesions.  umbilical region- slight redness inside but without swelling, discharge   PSYCH: Mentation appears normal, affect normal/bright, judgement and insight intact, normal speech and appearance well-groomed.      Diagnostic Test Results:  Labs reviewed in Epic  none         Assessment & Plan     (L73.9) Folliculitis  (primary encounter diagnosis)  Comment: superficial skin infection- inside the umbilical region- possibly from  abrasion from  nails  Plan: mupirocin (BACTROBAN) 2 % external ointment  Use q-tip to appointment antibiotic ointment.  If prescribed antibiotic- expensive ok to try over the counter triple antibiotic for 5-7 days  Follow up in 5-10 days for unresolved concerns             Return in about 2 weeks (around 6/15/2020) for virtual/video visit, medications recheck/review/refill.    Chari Andujar MD  [facility]      Video-Visit Details    Type of service:  Video Visit    Video End Time:12:14 PM    Originating Location (pt. Location): Home    Distant Location (provider location):  [facility] "     Platform used for Video Visit: AmWell    Return in about 2 weeks (around 6/15/2020) for virtual/video visit, medications recheck/review/refill.       Chari Andujar MD

## 2020-06-01 NOTE — PATIENT INSTRUCTIONS
(L73.9) Folliculitis  (primary encounter diagnosis)  Comment: superficial skin infection- inside the umbilical region- possibly from  abrasion from  nails  Plan: mupirocin (BACTROBAN) 2 % external ointment  Use q-tip to appointment antibiotic ointment.  If prescribed antibiotic- expensive ok to try over the counter triple antibiotic for 5-7 days  Follow up in 5-10 days for unresolved concerns

## 2020-06-01 NOTE — NURSING NOTE
"Chief Complaint   Patient presents with     Infection     has had her belly button pierced for many years- but her belly button started to ooz a couple      initial There were no vitals taken for this visit. Estimated body mass index is 32.12 kg/m  as calculated from the following:    Height as of 5/14/19: 1.664 m (5' 5.5\").    Weight as of 5/14/19: 88.9 kg (196 lb).  BP completed using cuff size: .  L  R arm      Health Maintenance that is potentially due pending provider review:      Bob Stephenson ma  "

## 2020-09-01 ENCOUNTER — OFFICE VISIT (OUTPATIENT)
Dept: FAMILY MEDICINE | Facility: CLINIC | Age: 30
End: 2020-09-01
Payer: COMMERCIAL

## 2020-09-01 VITALS
HEART RATE: 110 BPM | DIASTOLIC BLOOD PRESSURE: 94 MMHG | WEIGHT: 190 LBS | TEMPERATURE: 98.1 F | BODY MASS INDEX: 31.14 KG/M2 | SYSTOLIC BLOOD PRESSURE: 133 MMHG | OXYGEN SATURATION: 99 %

## 2020-09-01 DIAGNOSIS — K06.9 GUM DISEASE: Primary | ICD-10-CM

## 2020-09-01 LAB — HBA1C MFR BLD: 5.1 % (ref 0–5.6)

## 2020-09-01 PROCEDURE — 83036 HEMOGLOBIN GLYCOSYLATED A1C: CPT | Performed by: PHYSICIAN ASSISTANT

## 2020-09-01 PROCEDURE — 84443 ASSAY THYROID STIM HORMONE: CPT | Performed by: PHYSICIAN ASSISTANT

## 2020-09-01 PROCEDURE — 36415 COLL VENOUS BLD VENIPUNCTURE: CPT | Performed by: PHYSICIAN ASSISTANT

## 2020-09-01 PROCEDURE — 99213 OFFICE O/P EST LOW 20 MIN: CPT | Performed by: PHYSICIAN ASSISTANT

## 2020-09-01 PROCEDURE — 80053 COMPREHEN METABOLIC PANEL: CPT | Performed by: PHYSICIAN ASSISTANT

## 2020-09-01 NOTE — NURSING NOTE
"Chief Complaint   Patient presents with     Consult     dental issues check for diabetes     initial BP (!) 133/94 (BP Location: Right arm, Cuff Size: Adult Large)   Pulse 110   Temp 98.1  F (36.7  C) (Oral)   Wt 86.2 kg (190 lb)   SpO2 99%   BMI 31.14 kg/m   Estimated body mass index is 31.14 kg/m  as calculated from the following:    Height as of 5/14/19: 1.664 m (5' 5.5\").    Weight as of this encounter: 86.2 kg (190 lb).  BP completed using cuff size: large.   R arm      Health Maintenance that is potentially due pending provider review:  NONE    n/a    Bob Stephenson ma  "

## 2020-09-01 NOTE — RESULT ENCOUNTER NOTE
Dear Leana    Your test results are attached, feel free to contact me via [employer]     They were able to run that diabetes test already, and looks great.  No sign of diabetes.  I will contact you again when I get the rest of the labs back.    Yomi Montero PA-C

## 2020-09-01 NOTE — PROGRESS NOTES
Subjective     Leana Ivey is a 29 year old female who presents to clinic today for the following health issues:    HPI       Dental concern-check for diabetes    30 y/o female here for some lab work.  She has been working with her dentist over the last several months on some severe gum disease.  The dental provider would like her to be screened for diabetes to rule that out as a potential cause.  This does run in her family.    She has been eating low carb for the last several months, has lost about 20 lbs.          Review of Systems   Constitutional, HEENT, cardiovascular, pulmonary, gi and gu systems are negative, except as otherwise noted.      Objective    BP (!) 133/94 (BP Location: Right arm, Cuff Size: Adult Large)   Pulse 110   Temp 98.1  F (36.7  C) (Oral)   Wt 86.2 kg (190 lb)   SpO2 99%   BMI 31.14 kg/m    Body mass index is 31.14 kg/m .  Physical Exam   GENERAL: alert and no distress  EYES: Eyes grossly normal to inspection  RESP: lungs clear to auscultation - no rales, rhonchi or wheezes  CV: regular rate and rhythm, normal S1 S2, no S3 or S4, no murmur, click or rub, no peripheral edema and peripheral pulses strong    Results for orders placed or performed in visit on 09/01/20 (from the past 24 hour(s))   Hemoglobin A1c   Result Value Ref Range    Hemoglobin A1C 5.1 0 - 5.6 %           Assessment & Plan     Gum disease  Will get some further work up to determine if there is any underlying condition that would predispose or make gum disease worse.  - Comprehensive metabolic panel  - TSH with free T4 reflex  - Hemoglobin A1c           No follow-ups on file.    Eduardo Montero PA-C  [facility]

## 2020-09-02 LAB
ALBUMIN SERPL-MCNC: 4.2 G/DL (ref 3.4–5)
ALP SERPL-CCNC: 88 U/L (ref 40–150)
ALT SERPL W P-5'-P-CCNC: 57 U/L (ref 0–50)
ANION GAP SERPL CALCULATED.3IONS-SCNC: 6 MMOL/L (ref 3–14)
AST SERPL W P-5'-P-CCNC: 27 U/L (ref 0–45)
BILIRUB SERPL-MCNC: 0.2 MG/DL (ref 0.2–1.3)
BUN SERPL-MCNC: 15 MG/DL (ref 7–30)
CALCIUM SERPL-MCNC: 9.8 MG/DL (ref 8.5–10.1)
CHLORIDE SERPL-SCNC: 108 MMOL/L (ref 94–109)
CO2 SERPL-SCNC: 26 MMOL/L (ref 20–32)
CREAT SERPL-MCNC: 0.6 MG/DL (ref 0.52–1.04)
GFR SERPL CREATININE-BSD FRML MDRD: >90 ML/MIN/{1.73_M2}
GLUCOSE SERPL-MCNC: 92 MG/DL (ref 70–99)
POTASSIUM SERPL-SCNC: 3.8 MMOL/L (ref 3.4–5.3)
PROT SERPL-MCNC: 8.1 G/DL (ref 6.8–8.8)
SODIUM SERPL-SCNC: 140 MMOL/L (ref 133–144)
TSH SERPL DL<=0.005 MIU/L-ACNC: 0.92 MU/L (ref 0.4–4)

## 2020-09-03 NOTE — RESULT ENCOUNTER NOTE
Dear Leana    Your test results are attached, feel free to contact me via [email or web address]     The rest of your labs look just fine as well.  No thyroid concerns.    Yomi Montero PA-C

## 2020-11-16 ENCOUNTER — HEALTH MAINTENANCE LETTER (OUTPATIENT)
Age: 30
End: 2020-11-16

## 2021-09-18 ENCOUNTER — HEALTH MAINTENANCE LETTER (OUTPATIENT)
Age: 31
End: 2021-09-18

## 2022-01-08 ENCOUNTER — HEALTH MAINTENANCE LETTER (OUTPATIENT)
Age: 32
End: 2022-01-08

## 2022-11-19 ENCOUNTER — HEALTH MAINTENANCE LETTER (OUTPATIENT)
Age: 32
End: 2022-11-19

## 2023-04-09 ENCOUNTER — HEALTH MAINTENANCE LETTER (OUTPATIENT)
Age: 33
End: 2023-04-09